# Patient Record
Sex: MALE | Race: WHITE | NOT HISPANIC OR LATINO | Employment: UNEMPLOYED | URBAN - NONMETROPOLITAN AREA
[De-identification: names, ages, dates, MRNs, and addresses within clinical notes are randomized per-mention and may not be internally consistent; named-entity substitution may affect disease eponyms.]

---

## 2017-01-10 ENCOUNTER — APPOINTMENT (OUTPATIENT)
Dept: GENERAL RADIOLOGY | Facility: HOSPITAL | Age: 37
End: 2017-01-10

## 2017-01-10 ENCOUNTER — HOSPITAL ENCOUNTER (EMERGENCY)
Facility: HOSPITAL | Age: 37
Discharge: HOME | End: 2017-01-11
Attending: EMERGENCY MEDICINE | Admitting: EMERGENCY MEDICINE

## 2017-01-10 DIAGNOSIS — R07.89 OTHER CHEST PAIN: Primary | ICD-10-CM

## 2017-01-10 LAB
ALBUMIN SERPL-MCNC: 4.5 G/DL (ref 3.5–5)
ALBUMIN/GLOB SERPL: 1.3 G/DL (ref 1.1–2.5)
ALP SERPL-CCNC: 65 U/L (ref 24–120)
ALT SERPL W P-5'-P-CCNC: 61 U/L (ref 0–54)
AMPHET+METHAMPHET UR QL: NEGATIVE
AMYLASE SERPL-CCNC: 76 U/L (ref 30–110)
ANION GAP SERPL CALCULATED.3IONS-SCNC: 14 MMOL/L (ref 4–13)
APTT PPP: 30.7 SECONDS (ref 24.1–34.8)
AST SERPL-CCNC: 31 U/L (ref 7–45)
BARBITURATES UR QL SCN: NEGATIVE
BASOPHILS # BLD AUTO: 0.03 10*3/MM3 (ref 0–0.2)
BASOPHILS NFR BLD AUTO: 0.4 % (ref 0–2)
BENZODIAZ UR QL SCN: NEGATIVE
BILIRUB SERPL-MCNC: 0.7 MG/DL (ref 0.1–1)
BILIRUB UR QL STRIP: NEGATIVE
BUN BLD-MCNC: 17 MG/DL (ref 5–21)
BUN/CREAT SERPL: 16.3 (ref 7–25)
CALCIUM SPEC-SCNC: 9.7 MG/DL (ref 8.4–10.4)
CANNABINOIDS SERPL QL: NEGATIVE
CHLORIDE SERPL-SCNC: 103 MMOL/L (ref 98–110)
CLARITY UR: CLEAR
CO2 SERPL-SCNC: 28 MMOL/L (ref 24–31)
COCAINE UR QL: NEGATIVE
COLOR UR: YELLOW
CREAT BLD-MCNC: 1.04 MG/DL (ref 0.5–1.4)
D DIMER PPP FEU-MCNC: 0.23 MG/L (FEU) (ref 0–0.5)
DEPRECATED RDW RBC AUTO: 38.5 FL (ref 40–54)
EOSINOPHIL # BLD AUTO: 0.14 10*3/MM3 (ref 0–0.7)
EOSINOPHIL NFR BLD AUTO: 1.9 % (ref 0–4)
ERYTHROCYTE [DISTWIDTH] IN BLOOD BY AUTOMATED COUNT: 12.8 % (ref 12–15)
GFR SERPL CREATININE-BSD FRML MDRD: 82 ML/MIN/1.73
GLOBULIN UR ELPH-MCNC: 3.4 GM/DL
GLUCOSE BLD-MCNC: 91 MG/DL (ref 70–100)
GLUCOSE UR STRIP-MCNC: NEGATIVE MG/DL
HCT VFR BLD AUTO: 42.6 % (ref 40–52)
HGB BLD-MCNC: 14.9 G/DL (ref 14–18)
HGB UR QL STRIP.AUTO: NEGATIVE
IMM GRANULOCYTES # BLD: 0.01 10*3/MM3 (ref 0–0.03)
IMM GRANULOCYTES NFR BLD: 0.1 % (ref 0–5)
INR PPP: 0.94 (ref 0.91–1.09)
KETONES UR QL STRIP: NEGATIVE
LEUKOCYTE ESTERASE UR QL STRIP.AUTO: NEGATIVE
LIPASE SERPL-CCNC: 88 U/L (ref 23–203)
LYMPHOCYTES # BLD AUTO: 2.79 10*3/MM3 (ref 0.72–4.86)
LYMPHOCYTES NFR BLD AUTO: 37.8 % (ref 15–45)
MCH RBC QN AUTO: 29.6 PG (ref 28–32)
MCHC RBC AUTO-ENTMCNC: 35 G/DL (ref 33–36)
MCV RBC AUTO: 84.7 FL (ref 82–95)
METHADONE UR QL SCN: NEGATIVE
MONOCYTES # BLD AUTO: 0.47 10*3/MM3 (ref 0.19–1.3)
MONOCYTES NFR BLD AUTO: 6.4 % (ref 4–12)
NEUTROPHILS # BLD AUTO: 3.95 10*3/MM3 (ref 1.87–8.4)
NEUTROPHILS NFR BLD AUTO: 53.4 % (ref 39–78)
NITRITE UR QL STRIP: NEGATIVE
NT-PROBNP SERPL-MCNC: 24.5 PG/ML (ref 0–450)
OPIATES UR QL: POSITIVE
PCP UR QL SCN: NEGATIVE
PH UR STRIP.AUTO: 5.5 [PH] (ref 5–8)
PLATELET # BLD AUTO: 220 10*3/MM3 (ref 130–400)
PMV BLD AUTO: 12 FL (ref 6–12)
POTASSIUM BLD-SCNC: 3.9 MMOL/L (ref 3.5–5.3)
PROT SERPL-MCNC: 7.9 G/DL (ref 6.3–8.7)
PROT UR QL STRIP: NEGATIVE
PROTHROMBIN TIME: 12.8 SECONDS (ref 11.9–14.6)
RBC # BLD AUTO: 5.03 10*6/MM3 (ref 4.8–5.9)
SODIUM BLD-SCNC: 145 MMOL/L (ref 135–145)
SP GR UR STRIP: 1.01 (ref 1–1.03)
TROPONIN I SERPL-MCNC: 0 NG/ML (ref 0–0.07)
UROBILINOGEN UR QL STRIP: NORMAL
WBC NRBC COR # BLD: 7.39 10*3/MM3 (ref 4.8–10.8)

## 2017-01-10 PROCEDURE — 84484 ASSAY OF TROPONIN QUANT: CPT

## 2017-01-10 PROCEDURE — 80307 DRUG TEST PRSMV CHEM ANLYZR: CPT | Performed by: PHYSICIAN ASSISTANT

## 2017-01-10 PROCEDURE — 93005 ELECTROCARDIOGRAM TRACING: CPT | Performed by: EMERGENCY MEDICINE

## 2017-01-10 PROCEDURE — 96374 THER/PROPH/DIAG INJ IV PUSH: CPT

## 2017-01-10 PROCEDURE — 85025 COMPLETE CBC W/AUTO DIFF WBC: CPT | Performed by: EMERGENCY MEDICINE

## 2017-01-10 PROCEDURE — 80053 COMPREHEN METABOLIC PANEL: CPT | Performed by: EMERGENCY MEDICINE

## 2017-01-10 PROCEDURE — 82150 ASSAY OF AMYLASE: CPT | Performed by: PHYSICIAN ASSISTANT

## 2017-01-10 PROCEDURE — 85379 FIBRIN DEGRADATION QUANT: CPT | Performed by: PHYSICIAN ASSISTANT

## 2017-01-10 PROCEDURE — 71010 HC CHEST PA OR AP: CPT

## 2017-01-10 PROCEDURE — 85730 THROMBOPLASTIN TIME PARTIAL: CPT | Performed by: EMERGENCY MEDICINE

## 2017-01-10 PROCEDURE — 93010 ELECTROCARDIOGRAM REPORT: CPT | Performed by: INTERNAL MEDICINE

## 2017-01-10 PROCEDURE — 99284 EMERGENCY DEPT VISIT MOD MDM: CPT

## 2017-01-10 PROCEDURE — 83880 ASSAY OF NATRIURETIC PEPTIDE: CPT | Performed by: PHYSICIAN ASSISTANT

## 2017-01-10 PROCEDURE — 85610 PROTHROMBIN TIME: CPT | Performed by: EMERGENCY MEDICINE

## 2017-01-10 PROCEDURE — 83690 ASSAY OF LIPASE: CPT | Performed by: PHYSICIAN ASSISTANT

## 2017-01-10 PROCEDURE — 96361 HYDRATE IV INFUSION ADD-ON: CPT

## 2017-01-10 PROCEDURE — 81003 URINALYSIS AUTO W/O SCOPE: CPT | Performed by: EMERGENCY MEDICINE

## 2017-01-10 RX ORDER — MELOXICAM 15 MG/1
15 TABLET ORAL DAILY
COMMUNITY
End: 2018-04-20

## 2017-01-10 RX ORDER — FAMOTIDINE 10 MG/ML
20 INJECTION, SOLUTION INTRAVENOUS ONCE
Status: COMPLETED | OUTPATIENT
Start: 2017-01-10 | End: 2017-01-10

## 2017-01-10 RX ORDER — HYDROCODONE BITARTRATE AND ACETAMINOPHEN 7.5; 325 MG/1; MG/1
1 TABLET ORAL EVERY 6 HOURS PRN
COMMUNITY
End: 2018-04-20

## 2017-01-10 RX ORDER — ASPIRIN 325 MG
325 TABLET ORAL ONCE
Status: DISCONTINUED | OUTPATIENT
Start: 2017-01-10 | End: 2017-01-10

## 2017-01-10 RX ORDER — SODIUM CHLORIDE 9 MG/ML
125 INJECTION, SOLUTION INTRAVENOUS CONTINUOUS
Status: DISCONTINUED | OUTPATIENT
Start: 2017-01-10 | End: 2017-01-11 | Stop reason: HOSPADM

## 2017-01-10 RX ORDER — ASPIRIN 325 MG
162 TABLET ORAL ONCE
Status: DISCONTINUED | OUTPATIENT
Start: 2017-01-10 | End: 2017-01-10

## 2017-01-10 RX ORDER — ONDANSETRON 4 MG/1
4 TABLET, ORALLY DISINTEGRATING ORAL EVERY 8 HOURS PRN
COMMUNITY
End: 2018-04-20

## 2017-01-10 RX ORDER — SODIUM CHLORIDE 0.9 % (FLUSH) 0.9 %
10 SYRINGE (ML) INJECTION AS NEEDED
Status: DISCONTINUED | OUTPATIENT
Start: 2017-01-10 | End: 2017-01-11 | Stop reason: HOSPADM

## 2017-01-10 RX ORDER — ASPIRIN 81 MG/1
162 TABLET, CHEWABLE ORAL DAILY
Status: DISCONTINUED | OUTPATIENT
Start: 2017-01-11 | End: 2017-01-11 | Stop reason: HOSPADM

## 2017-01-10 RX ADMIN — SODIUM CHLORIDE 125 ML/HR: 9 INJECTION, SOLUTION INTRAVENOUS at 22:39

## 2017-01-10 RX ADMIN — SODIUM CHLORIDE, PRESERVATIVE FREE 10 ML: 5 INJECTION INTRAVENOUS at 22:32

## 2017-01-10 RX ADMIN — ASPIRIN 81 MG 162 MG: 81 TABLET ORAL at 22:27

## 2017-01-10 RX ADMIN — SODIUM CHLORIDE, PRESERVATIVE FREE 10 ML: 5 INJECTION INTRAVENOUS at 22:57

## 2017-01-10 RX ADMIN — FAMOTIDINE 20 MG: 10 INJECTION, SOLUTION INTRAVENOUS at 22:52

## 2017-01-11 VITALS
HEIGHT: 71 IN | RESPIRATION RATE: 15 BRPM | TEMPERATURE: 98.2 F | OXYGEN SATURATION: 97 % | BODY MASS INDEX: 30.52 KG/M2 | SYSTOLIC BLOOD PRESSURE: 118 MMHG | DIASTOLIC BLOOD PRESSURE: 80 MMHG | HEART RATE: 61 BPM | WEIGHT: 218 LBS

## 2017-01-11 LAB — TROPONIN I SERPL-MCNC: 0 NG/ML (ref 0–0.07)

## 2017-01-11 PROCEDURE — 96361 HYDRATE IV INFUSION ADD-ON: CPT

## 2017-01-11 PROCEDURE — 84484 ASSAY OF TROPONIN QUANT: CPT

## 2017-01-11 PROCEDURE — 93010 ELECTROCARDIOGRAM REPORT: CPT | Performed by: INTERNAL MEDICINE

## 2017-01-11 NOTE — ED PROVIDER NOTES
Subjective   Patient is a 36 y.o. male presenting with chest pain.   Chest Pain   Associated symptoms: nausea and shortness of breath      Patient is a pleasant 36 year old  gentleman that is present ED with his mother.  He describes experiencing chest discomfort for the past 4 days.  He states it has been sharp on the left side with occasional dull pain.  He does report that it radiates down his left arm and rarely up into his left side of his neck.  He states that the pain lasts for hours at a time.  He notices the pain occurs with rest and exertion.  He denies exertion making it worse.  He states that deep inspiration makes his pain worse.  He denies any cough.  He denies any fever.  He denies any injections.  He has been experiencing nausea, dizziness, shortness of breath, and diaphoresis as well.  At its worst, the patient's pain is at a moderate level.  Presently, it is mild.  His mother gave him aspirin 81 mg ×2 prior to arrival.    The patient denies ever completing a stress test, stress echo, or cardiac catheterization.  He denies a personal history of hyperlipidemia, hypercholesterolemia, tobacco abuse, or diabetes.  He denies family cardiac history.  He does have a history of cervical disc disease with a known herniation.  He receives pain medications for this but denies any injection,.  He reports the pain presents differently for his neck pain.  He also has a known lower lumbar herniated disc.  He denies any recent procedures or travels.  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  HE denies any leg pain or cramping.      Review of Systems   Constitutional: Negative.    HENT: Negative.    Respiratory: Positive for shortness of breath.    Cardiovascular: Positive for chest pain.   Gastrointestinal: Positive for nausea.   Genitourinary: Negative.    All other systems reviewed and are negative.      Past Medical History   Diagnosis Date   • Cervical herniated disc    •  "Lumbar herniated disc        No Known Allergies    Past Surgical History   Procedure Laterality Date   • Femur surgery     • Ankle surgery     • Mandible fracture surgery         History reviewed. No pertinent family history.    Social History     Social History   • Marital status:      Spouse name: N/A   • Number of children: N/A   • Years of education: N/A     Social History Main Topics   • Smoking status: Never Smoker   • Smokeless tobacco: Current User   • Alcohol use No   • Drug use: No   • Sexual activity: Not Asked     Other Topics Concern   • None     Social History Narrative   • None       Prior to Admission medications    Medication Sig Start Date End Date Taking? Authorizing Provider   HYDROcodone-acetaminophen (NORCO) 7.5-325 MG per tablet Take 1 tablet by mouth Every 6 (Six) Hours As Needed for moderate pain (4-6).   Yes Historical Provider, MD   meloxicam (MOBIC) 15 MG tablet Take 15 mg by mouth Daily.   Yes Historical Provider, MD   ondansetron ODT (ZOFRAN-ODT) 4 MG disintegrating tablet Take 4 mg by mouth Every 8 (Eight) Hours As Needed for nausea or vomiting.   Yes Historical Provider, MD       Medications   famotidine (PEPCID) injection 20 mg (20 mg Intravenous Given 1/10/17 1940)       Visit Vitals   • /80 (BP Location: Left arm, Patient Position: Lying)   • Pulse 61   • Temp 98.2 °F (36.8 °C) (Oral)   • Resp 15   • Ht 71\" (180.3 cm)   • Wt 218 lb (98.9 kg)   • SpO2 97%   • BMI 30.4 kg/m2         Objective   Physical Exam   Constitutional: He is oriented to person, place, and time. He appears well-developed and well-nourished.   HENT:   Head: Normocephalic and atraumatic.   Nose: Nose normal.   Eyes: Conjunctivae and EOM are normal. Pupils are equal, round, and reactive to light.   Neck: Normal range of motion. Neck supple. No tracheal deviation present.   Cardiovascular: Normal rate, regular rhythm, normal heart sounds and intact distal pulses.  Exam reveals no gallop and no " friction rub.    No murmur heard.  Pulmonary/Chest: Effort normal and breath sounds normal. No respiratory distress. He has no wheezes. He has no rales. He exhibits no tenderness.   Abdominal: Soft. Bowel sounds are normal. He exhibits no distension and no mass. There is no tenderness. There is no rebound and no guarding.   Musculoskeletal: Normal range of motion. He exhibits no edema, tenderness or deformity.   Neurological: He is alert and oriented to person, place, and time.   Skin: Skin is warm and dry. No rash noted. No erythema. No pallor.   Psychiatric: He has a normal mood and affect. His behavior is normal. Judgment and thought content normal.   Vitals reviewed.      Procedures         Lab Results (last 24 hours)     Procedure Component Value Units Date/Time    Urinalysis With / Culture If Indicated [[id number]]  (Normal) Collected:  01/10/17 2215    Specimen:  Urine from Urine, Clean Catch Updated:  01/10/17 2256     Color, UA Yellow      Appearance, UA Clear      pH, UA 5.5      Specific Gravity, UA 1.010      Glucose, UA Negative      Ketones, UA Negative      Bilirubin, UA Negative      Blood, UA Negative      Protein, UA Negative      Leuk Esterase, UA Negative      Nitrite, UA Negative      Urobilinogen, UA 0.2 E.U./dL     Narrative:       Urine microscopic not indicated.    Urine Drug Screen [[id number]]  (Abnormal) Collected:  01/10/17 2215    Specimen:  Urine from Urine, Clean Catch Updated:  01/10/17 2314     Amphetamine Screen, Urine Negative      Barbiturates Screen, Urine Negative      Benzodiazepine Screen, Urine Negative      Cocaine Screen, Urine Negative      Methadone Screen, Urine Negative      Opiate Screen Positive (A)      Phencyclidine (PCP), Urine Negative      THC, Screen, Urine Negative     Narrative:       Negative Thresholds For Drugs Screened in Urine:    Amphetamines          500 ng/ml  Barbiturates          200 ng/ml  Benzodiazepines       200 ng/ml  Cocaine               150  ng/ml  Methadone             150 ng/ml  Opiates               300 ng/ml  Phencyclidine         25 ng/ml  THC                      50 ng/ml    The normal value for all drugs tested is negative. This report includes final unconfirmed screening results.  A positive result by this assay can be, at your request, sent to the Reference Lab for confirmation by gas chromatography. Unconfirmed results must not be used for non-medical purposes, such as employment or legal testing. Clinical consideration should be applied to any drug of abuse test result, particularly when unconfirmed results are used.    CBC & Differential [[id number]] Collected:  01/10/17 2230    Specimen:  Blood Updated:  01/10/17 2255    Narrative:       The following orders were created for panel order CBC & Differential.  Procedure                               Abnormality         Status                     ---------                               -----------         ------                     CBC Auto Differential[[id number]]         Abnormal            Final result                 Please view results for these tests on the individual orders.    aPTT [[id number]]  (Normal) Collected:  01/10/17 2230    Specimen:  Blood from Hand, Left Updated:  01/10/17 2306     PTT 30.7 seconds     Protime-INR [[id number]]  (Normal) Collected:  01/10/17 2230    Specimen:  Blood from Hand, Left Updated:  01/10/17 2306     Protime 12.8 Seconds      INR 0.94     Comprehensive Metabolic Panel [[id number]]  (Abnormal) Collected:  01/10/17 2230    Specimen:  Blood from Hand, Left Updated:  01/10/17 2306     Glucose 91 mg/dL      BUN 17 mg/dL      Creatinine 1.04 mg/dL      Sodium 145 mmol/L      Potassium 3.9 mmol/L      Chloride 103 mmol/L      CO2 28.0 mmol/L      Calcium 9.7 mg/dL      Total Protein 7.9 g/dL      Albumin 4.50 g/dL      ALT (SGPT) 61 (H) U/L      AST (SGOT) 31 U/L      Alkaline Phosphatase 65 U/L      Total Bilirubin 0.7 mg/dL      eGFR Non African Amer 82 mL/min/1.73       Globulin 3.4 gm/dL      A/G Ratio 1.3 g/dL      BUN/Creatinine Ratio 16.3      Anion Gap 14.0 (H) mmol/L     CBC Auto Differential [[id number]]  (Abnormal) Collected:  01/10/17 2230    Specimen:  Blood from Hand, Left Updated:  01/10/17 2255     WBC 7.39 10*3/mm3      RBC 5.03 10*6/mm3      Hemoglobin 14.9 g/dL      Hematocrit 42.6 %      MCV 84.7 fL      MCH 29.6 pg      MCHC 35.0 g/dL      RDW 12.8 %      RDW-SD 38.5 (L) fl      MPV 12.0 fL      Platelets 220 10*3/mm3      Neutrophil % 53.4 %      Lymphocyte % 37.8 %      Monocyte % 6.4 %      Eosinophil % 1.9 %      Basophil % 0.4 %      Immature Grans % 0.1 %      Neutrophils, Absolute 3.95 10*3/mm3      Lymphocytes, Absolute 2.79 10*3/mm3      Monocytes, Absolute 0.47 10*3/mm3      Eosinophils, Absolute 0.14 10*3/mm3      Basophils, Absolute 0.03 10*3/mm3      Immature Grans, Absolute 0.01 10*3/mm3     Amylase [[id number]]  (Normal) Collected:  01/10/17 2230    Specimen:  Blood from Hand, Left Updated:  01/10/17 2306     Amylase 76 U/L     Lipase [[id number]]  (Normal) Collected:  01/10/17 2230    Specimen:  Blood from Hand, Left Updated:  01/10/17 2306     Lipase 88 U/L     D-dimer, Quantitative [[id number]]  (Normal) Collected:  01/10/17 2230    Specimen:  Blood from Hand, Left Updated:  01/10/17 2306     D-Dimer, Quantitative 0.23 mg/L (FEU)     Narrative:       Reference Range is 0-0.50 mg/L FEU. However, results <0.50 mg/L FEU tends to rule out DVT or PE. Results >0.50 mg/L FEU are not useful in predicting absence or presence of DVT or PE.    BNP [[id number]]  (Normal) Collected:  01/10/17 2230    Specimen:  Blood from Hand, Left Updated:  01/10/17 2314     proBNP 24.5 pg/mL     POC Troponin, Rapid [[id number]]  (Normal) Collected:  01/10/17 2249    Specimen:  Blood Updated:  01/10/17 2305     Troponin I 0.00 ng/mL       Serial Number: [id number]    : [id number]       POC Troponin, Rapid [[id number]]  (Normal) Collected:  01/11/17 0134    Specimen:  Blood Updated:   01/11/17 0150     Troponin I 0.00 ng/mL       Serial Number: [id number]    : [id number]             Xr Chest 1 View    Result Date: 1/11/2017  Narrative: EXAMINATION: XR CHEST 1 VW- 1/11/2017 7:06 AM CST  HISTORY: Chest pain  COMPARISON: None  FINDINGS: Heart and mediastinal contours are normal. The lungs are clear without focal consolidation or effusion. No pneumothorax. Normal pulmonary vasculature. Prior granulomatous exposure.      Impression: No acute findings. This report was finalized on 01/11/2017 09:37 by Dr. Rob Gomez MD.      ED Course  ED Course   Value Comment By Time   Albumin: 4.50 (Reviewed) Val Cespedes DO 01/11 0127     HEART Score  History: Moderately suspicious (+1)  ECG: Normal (+0)  Age: Less than 45 (+0)  Risk Factors: No risk factors known (+0)  Troponin: Normal limit or lower (+0)  Total: 1       I reviewed this case with Dr. Cespedes who will be assuming the patient's care.     Working diagnosis is:  MDM    Final diagnoses:   Other chest pain          Maggyu-LOUIS Barron  01/11/17 1250

## 2017-01-13 ENCOUNTER — HOSPITAL ENCOUNTER (OUTPATIENT)
Dept: CARDIOLOGY | Facility: HOSPITAL | Age: 37
Discharge: HOME | End: 2017-01-13
Attending: EMERGENCY MEDICINE | Admitting: EMERGENCY MEDICINE

## 2017-01-13 VITALS
SYSTOLIC BLOOD PRESSURE: 127 MMHG | HEART RATE: 66 BPM | DIASTOLIC BLOOD PRESSURE: 76 MMHG | BODY MASS INDEX: 30.52 KG/M2 | WEIGHT: 218 LBS | HEIGHT: 71 IN

## 2017-01-13 DIAGNOSIS — R07.89 OTHER CHEST PAIN: ICD-10-CM

## 2017-01-13 LAB
BH CV STRESS BP STAGE 1: NORMAL
BH CV STRESS BP STAGE 2: NORMAL
BH CV STRESS BP STAGE 3: NORMAL
BH CV STRESS BP STAGE 4: NORMAL
BH CV STRESS DURATION MIN STAGE 1: 3
BH CV STRESS DURATION MIN STAGE 2: 3
BH CV STRESS DURATION MIN STAGE 3: 3
BH CV STRESS DURATION MIN STAGE 4: 1
BH CV STRESS DURATION SEC STAGE 1: 0
BH CV STRESS DURATION SEC STAGE 2: 0
BH CV STRESS DURATION SEC STAGE 3: 0
BH CV STRESS DURATION SEC STAGE 4: 47
BH CV STRESS GRADE STAGE 1: 10
BH CV STRESS GRADE STAGE 2: 12
BH CV STRESS GRADE STAGE 3: 14
BH CV STRESS GRADE STAGE 4: 16
BH CV STRESS HR STAGE 1: 90
BH CV STRESS HR STAGE 2: 115
BH CV STRESS HR STAGE 3: 150
BH CV STRESS HR STAGE 4: 180
BH CV STRESS METS STAGE 1: 5
BH CV STRESS METS STAGE 2: 7.5
BH CV STRESS METS STAGE 3: 10
BH CV STRESS METS STAGE 4: 13.5
BH CV STRESS PROTOCOL 1: NORMAL
BH CV STRESS RECOVERY BP: NORMAL MMHG
BH CV STRESS RECOVERY HR: 99 BPM
BH CV STRESS SPEED STAGE 1: 1.7
BH CV STRESS SPEED STAGE 2: 2.5
BH CV STRESS SPEED STAGE 3: 3.4
BH CV STRESS SPEED STAGE 4: 4.2
BH CV STRESS STAGE 1: 1
BH CV STRESS STAGE 2: 2
BH CV STRESS STAGE 3: 3
BH CV STRESS STAGE 4: 4
MAXIMAL PREDICTED HEART RATE: 186 BPM
PERCENT MAX PREDICTED HR: 96.77 %
STRESS BASELINE BP: NORMAL MMHG
STRESS BASELINE HR: 66 BPM
STRESS PERCENT HR: 114 %
STRESS POST ESTIMATED WORKLOAD: 13.5 METS
STRESS POST EXERCISE DUR MIN: 10 MIN
STRESS POST EXERCISE DUR SEC: 47 SEC
STRESS POST PEAK BP: NORMAL MMHG
STRESS POST PEAK HR: 180 BPM
STRESS TARGET HR: 158 BPM

## 2017-01-13 PROCEDURE — 93017 CV STRESS TEST TRACING ONLY: CPT

## 2017-01-13 PROCEDURE — 25010000002 PERFLUTREN (DEFINITY) 8.476 MG IN SODIUM CHLORIDE 10 ML INJECTION: Performed by: INTERNAL MEDICINE

## 2017-01-13 PROCEDURE — 93351 STRESS TTE COMPLETE: CPT

## 2017-01-13 RX ADMIN — SODIUM CHLORIDE 5 ML: 9 INJECTION INTRAMUSCULAR; INTRAVENOUS; SUBCUTANEOUS at 14:45

## 2017-01-13 RX ADMIN — SODIUM CHLORIDE 5 ML: 9 INJECTION INTRAMUSCULAR; INTRAVENOUS; SUBCUTANEOUS at 13:59

## 2017-07-10 ENCOUNTER — APPOINTMENT (OUTPATIENT)
Dept: CT IMAGING | Facility: HOSPITAL | Age: 37
End: 2017-07-10

## 2017-07-10 ENCOUNTER — HOSPITAL ENCOUNTER (EMERGENCY)
Facility: HOSPITAL | Age: 37
Discharge: HOME | End: 2017-07-11
Admitting: EMERGENCY MEDICINE

## 2017-07-10 DIAGNOSIS — G43.809 HEADACHE, VARIANT MIGRAINE: ICD-10-CM

## 2017-07-10 DIAGNOSIS — H53.8 BLURRY VISION, BILATERAL: Primary | ICD-10-CM

## 2017-07-10 PROCEDURE — 99284 EMERGENCY DEPT VISIT MOD MDM: CPT

## 2017-07-10 PROCEDURE — 70450 CT HEAD/BRAIN W/O DYE: CPT

## 2017-07-10 RX ORDER — TOPIRAMATE 50 MG/1
50 TABLET, FILM COATED ORAL 2 TIMES DAILY
COMMUNITY
End: 2018-04-20

## 2017-07-11 ENCOUNTER — HOSPITAL ENCOUNTER (EMERGENCY)
Facility: HOSPITAL | Age: 37
Discharge: HOME | End: 2017-07-11
Attending: EMERGENCY MEDICINE | Admitting: EMERGENCY MEDICINE

## 2017-07-11 ENCOUNTER — APPOINTMENT (OUTPATIENT)
Dept: MRI IMAGING | Facility: HOSPITAL | Age: 37
End: 2017-07-11

## 2017-07-11 ENCOUNTER — TELEPHONE (OUTPATIENT)
Dept: NEUROLOGY | Facility: CLINIC | Age: 37
End: 2017-07-11

## 2017-07-11 VITALS
HEART RATE: 54 BPM | SYSTOLIC BLOOD PRESSURE: 133 MMHG | DIASTOLIC BLOOD PRESSURE: 80 MMHG | OXYGEN SATURATION: 98 % | RESPIRATION RATE: 16 BRPM | HEIGHT: 71 IN | TEMPERATURE: 98.1 F | WEIGHT: 215 LBS | BODY MASS INDEX: 30.1 KG/M2

## 2017-07-11 VITALS
DIASTOLIC BLOOD PRESSURE: 79 MMHG | OXYGEN SATURATION: 99 % | HEIGHT: 71 IN | RESPIRATION RATE: 18 BRPM | WEIGHT: 214.95 LBS | HEART RATE: 65 BPM | SYSTOLIC BLOOD PRESSURE: 119 MMHG | BODY MASS INDEX: 30.09 KG/M2 | TEMPERATURE: 98.5 F

## 2017-07-11 DIAGNOSIS — H53.8 BLURRED VISION: Primary | ICD-10-CM

## 2017-07-11 PROCEDURE — 0 GADOBENATE DIMEGLUMINE 529 MG/ML SOLUTION: Performed by: PSYCHIATRY & NEUROLOGY

## 2017-07-11 PROCEDURE — 96374 THER/PROPH/DIAG INJ IV PUSH: CPT

## 2017-07-11 PROCEDURE — 70553 MRI BRAIN STEM W/O & W/DYE: CPT

## 2017-07-11 PROCEDURE — A9577 INJ MULTIHANCE: HCPCS | Performed by: PSYCHIATRY & NEUROLOGY

## 2017-07-11 PROCEDURE — 99283 EMERGENCY DEPT VISIT LOW MDM: CPT

## 2017-07-11 PROCEDURE — 25010000002 PROMETHAZINE PER 50 MG: Performed by: PSYCHIATRY & NEUROLOGY

## 2017-07-11 PROCEDURE — 99283 EMERGENCY DEPT VISIT LOW MDM: CPT | Performed by: PSYCHIATRY & NEUROLOGY

## 2017-07-11 PROCEDURE — 96375 TX/PRO/DX INJ NEW DRUG ADDON: CPT

## 2017-07-11 PROCEDURE — 25010000002 DEXAMETHASONE PER 1 MG: Performed by: PSYCHIATRY & NEUROLOGY

## 2017-07-11 RX ORDER — PROMETHAZINE HYDROCHLORIDE 25 MG/ML
25 INJECTION, SOLUTION INTRAMUSCULAR; INTRAVENOUS ONCE
Status: COMPLETED | OUTPATIENT
Start: 2017-07-11 | End: 2017-07-11

## 2017-07-11 RX ORDER — DIPHENHYDRAMINE HYDROCHLORIDE 50 MG/ML
25 INJECTION INTRAMUSCULAR; INTRAVENOUS EVERY 6 HOURS PRN
Status: DISCONTINUED | OUTPATIENT
Start: 2017-07-11 | End: 2017-07-11 | Stop reason: HOSPADM

## 2017-07-11 RX ORDER — DEXAMETHASONE SODIUM PHOSPHATE 10 MG/ML
10 INJECTION INTRAMUSCULAR; INTRAVENOUS ONCE
Status: COMPLETED | OUTPATIENT
Start: 2017-07-11 | End: 2017-07-11

## 2017-07-11 RX ADMIN — PROMETHAZINE HYDROCHLORIDE 25 MG: 25 INJECTION INTRAMUSCULAR; INTRAVENOUS at 14:37

## 2017-07-11 RX ADMIN — GADOBENATE DIMEGLUMINE 10 ML: 529 INJECTION, SOLUTION INTRAVENOUS at 15:05

## 2017-07-11 RX ADMIN — DEXAMETHASONE SODIUM PHOSPHATE 10 MG: 10 INJECTION, SOLUTION INTRAMUSCULAR; INTRAVENOUS at 13:57

## 2017-07-11 NOTE — DISCHARGE INSTRUCTIONS
No driving or working until blurry vision is completely resolved.  Call Dr. St'ss office tomorrow without improvement

## 2017-07-11 NOTE — TELEPHONE ENCOUNTER
"Mom called to request Luis be seen in the office today. He was treated and released from the ER last night. He had a migraine with vision changes. Today he still complains of not being able to see in front of him. Everything looks \"distorted\". Since I was not able to offer an appointment today she did tell me she is bringing him back to the ER to be re evaluated. I did let Dr St know in case he is contacted by the ER.   "

## 2017-07-11 NOTE — CONSULTS
Neurology Consult Note    Referring Provider: Dr. Ronny Fajardo  Reason for Consultation: vision changes      History of present illness:      37-year-old  male with a history of cervical disc disease followed by the orthopedic [facility] here locally.  He also follows with Dr. Frankel locally for pain management.  He presented overnight our emergency Department with decreased visual acuity at distance bilaterally.  At the time he complained of a headache but speaking with him today he denies a headache overnight.  He underwent routine imaging including a CT of the head which was unremarkable.  He was discharged home.  He has re-presented this morning complaining of decreased visual acuity at distance.  He denies a headache although has a headache 5 days a week many weeks although can go a week without it.  He describes it as holocephalic in nature with light sensitivity.  He denies nausea and vomiting.  He does access Norco and Nicole multiple times per day on a daily basis.  He denies fortification spectra.  He does suggest that when he was looking straight life last night they seemed bigger than normal.  In addition to that when he looked at his windshield and his car it appeared to be covered with ice.  He denies unilateral vision loss.  He denies darkening of his vision.  He denies speech changes.  He denies unilateral weakness or numbness.      Past Medical History:   Diagnosis Date   • Cervical herniated disc    • Lumbar herniated disc        No Known Allergies  No current facility-administered medications on file prior to encounter.      Current Outpatient Prescriptions on File Prior to Encounter   Medication Sig   • HYDROcodone-acetaminophen (NORCO) 7.5-325 MG per tablet Take 1 tablet by mouth Every 6 (Six) Hours As Needed for moderate pain (4-6).   • meloxicam (MOBIC) 15 MG tablet Take 15 mg by mouth Daily.   • Multiple Vitamin (ONE-A-DAY MENS PO) Take  by mouth. 2 gummies a day   • ondansetron ODT  (ZOFRAN-ODT) 4 MG disintegrating tablet Take 4 mg by mouth Every 8 (Eight) Hours As Needed for nausea or vomiting.   • topiramate (TOPAMAX) 50 MG tablet Take 50 mg by mouth 2 (Two) Times a Day.       Social History     Social History   • Marital status:      Spouse name: N/A   • Number of children: N/A   • Years of education: N/A     Occupational History   • Not on file.     Social History Main Topics   • Smoking status: Never Smoker   • Smokeless tobacco: Current User   • Alcohol use No   • Drug use: No   • Sexual activity: Not on file     Other Topics Concern   • Not on file     Social History Narrative     Family History   Problem Relation Age of Onset   • Heart disease Paternal Grandmother        Review of Systems  A 14 point review of systems was reviewed and was negative except for Vision changes    Vital Signs   Temp:  [98.1 °F (36.7 °C)-98.7 °F (37.1 °C)] 98.7 °F (37.1 °C)  Heart Rate:  [54-71] 71  Resp:  [16] 16  BP: (121-133)/(80-86) 121/84    General Exam:  Head:  Normal cephalic, atraumatic  HEENT:  Neck supple  Fundoscopic Exam:  No signs of disc edema  CVS:  Regular rate and rhythm.  No murmurs  Carotid Examination:  No bruits  Lungs:  Clear to auscultation  Abdomen:  Non-tender, Non-distended  Extremities:  No signs of peripheral edema  Skin:  No rashes    Neurologic Exam:    Mental Status:    -Awake, Alert, Oriented X 3  -No word finding difficulties  -No aphasia  -No dysarthria  -Follows simple and complex commands    CN II:  Visual fields full.  Pupils equally reactive to light  CN III, IV, VI:  Extraocular Muscles full with no signs of nystagmus  CN V:  Facial sensory is symmetric with no asymetries.  CN VII:  Facial motor symmetric  CN VIII:  Gross hearing intact bilaterally  CN IX:  Palate elevates symmetrically  CN X:  Palate elevates symmetrically  CN XI:  Shoulder shrug symmetric  CN XII:  Tongue is midline on protrusion    Motor: (strength out of 5:  1= minimal movement, 2 = movement  in plane of gravity, 3 = movement against gravity, 4 = movement against some resistance, 5 = full strength)    -Right Upper Ext: Proximal: 5 Distal: 5  -Left Upper Ext: Proximal: 5 Distal: 5    -Right Lower Ext: Proximal: 5 Distal: 5  -Left Lower Ext: Proximal: 5 Distal: 5    DTR:  -Right   Bicep: 2+ Tricep: 2+ Brachoradialis: 2+   Patella: 2+ Ankle: 2+ Neg Babinski  -Left   Bicep: 2+ Tricep: 2+ Brachoradialis: 2+   Patella: 2+ Ankle: 2+ Neg Babinski    Sensory:  -Intact to light touch, pinprick, temperature, pain, and proprioception    Coordination:  -Finger to nose intact  -Heel to shin intact  -No ataxia    Gait  -No signs of ataxia  -ambulates unassisted      Results Review:  Lab Results (last 24 hours)     ** No results found for the last 24 hours. **          .  Imaging Results (last 24 hours)     ** No results found for the last 24 hours. **          CT of head without contrast shows no acute findings.    Impression    1.  Migraine with visual aura    Plan    --We will try migraine cocktail of Decadron 2 mg, Phenergan 25 mg, Benadryl 25 mg  --MR brain with and without contrast to rule out CNS structural lesions as a cause  --From a neurology standpoint cleared to be discharged home which MRI is read and normal.  If the problem persists then follow-up with ophthalmology is recommended.    I discussed the patients findings and my recommendations with patient, family and consulting provider    Pedrito St MD  07/11/17  1:29 PM

## 2017-07-11 NOTE — ED PROVIDER NOTES
Subjective   HPI Comments: Patient states that he has a history of migraines.  He states that he woke up this mornignw ith a severe headache, generalized.  He states that it was worse that his normal migraines.  He took a Topomax that he had at home and after a while was able to go back to sleep.  He states that he woke up later at 9 am and his headache was resolved.  However, he noticed that his vision was blurry in both eyes.  He states that it has been blurry all day long.  He has continued to be without headache since taking Topomax.    Patient is a 37 y.o. male presenting with eye problem.   History provided by:  Patient   used: No    Eye Problem   Location:  Both eyes  Quality: blurry vision.  Severity:  Severe  Onset quality:  Sudden  Duration:  15 hours  Timing:  Constant  Progression:  Unchanged  Chronicity:  New  Context: not burn, not chemical exposure, not contact lens problem, not direct trauma, not using machinery, not scratch, not smoke exposure and not UV exposure    Relieved by:  Nothing  Worsened by:  Nothing  Ineffective treatments:  None tried  Associated symptoms: blurred vision    Associated symptoms: no crusting, no discharge, no double vision, no facial rash, no inflammation, no itching, no nausea, no numbness, no photophobia, no redness, no scotomas, no swelling, no tearing, no tingling, no vomiting and no weakness        Review of Systems   Constitutional: Negative.    HENT:        Blurry vision   Eyes: Positive for blurred vision. Negative for double vision, photophobia, discharge, redness and itching.   Respiratory: Negative.    Cardiovascular: Negative.    Gastrointestinal: Negative.  Negative for nausea and vomiting.   Endocrine: Negative.    Genitourinary: Negative.    Musculoskeletal: Negative.    Skin: Negative.    Allergic/Immunologic: Negative.    Neurological: Negative for tingling, weakness and numbness.   Psychiatric/Behavioral: Negative.        Past Medical  History:   Diagnosis Date   • Cervical herniated disc    • Lumbar herniated disc        No Known Allergies    Past Surgical History:   Procedure Laterality Date   • ANKLE SURGERY     • FEMUR SURGERY     • MANDIBLE FRACTURE SURGERY         Family History   Problem Relation Age of Onset   • Heart disease Paternal Grandmother        Social History     Social History   • Marital status:      Spouse name: N/A   • Number of children: N/A   • Years of education: N/A     Social History Main Topics   • Smoking status: Never Smoker   • Smokeless tobacco: Current User   • Alcohol use No   • Drug use: No   • Sexual activity: Not Asked     Other Topics Concern   • None     Social History Narrative           Objective   Physical Exam   Constitutional: He appears well-developed and well-nourished. No distress.   HENT:   Head: Normocephalic and atraumatic.   Eyes: Conjunctivae, EOM and lids are normal. Pupils are equal, round, and reactive to light. Right eye exhibits no discharge, no exudate and no hordeolum. No foreign body present in the right eye. Left eye exhibits no discharge, no exudate and no hordeolum. No foreign body present in the left eye.   Visual acuity 20/160 bilaterally   Skin: He is not diaphoretic.   Nursing note and vitals reviewed.      Procedures         ED Course  ED Course   Comment By Time   Discussed case with Dr. St.  Discussed headache that resolved, continued blurry vision with VC of 20/160 bilaterally and negative CT head.  Dr. St felt that patient could be discharged home.  He states that patient can call his office without improvement for follow-up.  States that patient cannot drive until vision has resolved. Onofre Garner PA-C 07/11 0027                  [facility]    Final diagnoses:   Blurry vision, bilateral   Headache, variant migraine            Onofre Garner PA-C  07/11/17 0029

## 2017-07-12 NOTE — ED PROVIDER NOTES
Subjective   HPI Comments: Patient had migraine headache 2 days ago and took a topamax for it.  Went to bed and when woke up headache was gone but vision blurred at distance though can see close.  Seen in ED yesterday and came back to see Dr. St today in ED.    Patient is a 37 y.o. male presenting with eye problem.   History provided by:  Patient   used: No    Eye Problem   Location:  Both eyes  Quality: blurred vision at distance.  Severity:  Moderate  Onset quality:  Gradual  Duration:  1 day  Timing:  Constant  Progression:  Unchanged  Chronicity:  New  Context: not burn, not chemical exposure, not direct trauma, not foreign body, not using machinery, not scratch, not smoke exposure and not UV exposure    Relieved by:  Nothing  Worsened by:  Nothing  Ineffective treatments:  None tried  Associated symptoms: blurred vision and headaches    Associated symptoms: no crusting, no decreased vision, no discharge, no double vision, no facial rash, no inflammation, no itching, no nausea, no numbness, no photophobia, no redness, no scotomas, no swelling, no tearing, no tingling, no vomiting and no weakness    Risk factors: no conjunctival hemorrhage, no exposure to pinkeye, no previous injury to eye, no recent herpes zoster and no recent URI        Review of Systems   Constitutional: Negative.    Eyes: Positive for blurred vision and visual disturbance. Negative for double vision, photophobia, discharge, redness and itching.   Respiratory: Negative.    Cardiovascular: Negative.    Gastrointestinal: Negative.  Negative for nausea and vomiting.   Genitourinary: Negative.    Musculoskeletal: Negative.    Neurological: Positive for headaches. Negative for tingling, weakness and numbness.   Hematological: Negative.    Psychiatric/Behavioral: Negative.    All other systems reviewed and are negative.      Past Medical History:   Diagnosis Date   • Cervical herniated disc    • Lumbar herniated disc         No Known Allergies    Past Surgical History:   Procedure Laterality Date   • ANKLE SURGERY     • FEMUR SURGERY     • MANDIBLE FRACTURE SURGERY         Family History   Problem Relation Age of Onset   • Heart disease Paternal Grandmother        Social History     Social History   • Marital status:      Spouse name: N/A   • Number of children: N/A   • Years of education: N/A     Social History Main Topics   • Smoking status: Never Smoker   • Smokeless tobacco: Current User   • Alcohol use No   • Drug use: No   • Sexual activity: Not Asked     Other Topics Concern   • None     Social History Narrative           Objective   Physical Exam   Constitutional: He is oriented to person, place, and time. He appears well-developed.   Eyes: EOM are normal. Pupils are equal, round, and reactive to light.   Neurological: He is alert and oriented to person, place, and time. He displays normal reflexes. No cranial nerve deficit. He exhibits normal muscle tone. Coordination normal.   Psychiatric: He has a normal mood and affect. His behavior is normal.   Nursing note and vitals reviewed.      Procedures         ED Course  ED Course   Comment By Time   Dr. St had examined and treated patient and asked that I check on results of patient MRI.  They were normal and I told patient this and to follow up with opthamology if symptoms continue based on Dr. St's advice. Ga Phillips Jr., MD 07/12 6715                  [facility]  Number of Diagnoses or Management Options  Blurred vision: new and requires workup     Amount and/or Complexity of Data Reviewed  Tests in the radiology section of CPT®: ordered and reviewed    Risk of Complications, Morbidity, and/or Mortality  Presenting problems: low  Diagnostic procedures: low  Management options: low    Patient Progress  Patient progress: stable      Final diagnoses:   Blurred vision            Ga Phillips Jr., MD  07/12/17 3567

## 2018-01-30 ENCOUNTER — APPOINTMENT (OUTPATIENT)
Dept: LAB | Facility: HOSPITAL | Age: 38
End: 2018-01-30

## 2018-01-30 PROCEDURE — 80307 DRUG TEST PRSMV CHEM ANLYZR: CPT

## 2018-02-19 ENCOUNTER — HOSPITAL ENCOUNTER (EMERGENCY)
Facility: HOSPITAL | Age: 38
Discharge: HOME | End: 2018-02-19
Attending: EMERGENCY MEDICINE | Admitting: EMERGENCY MEDICINE

## 2018-02-19 VITALS
TEMPERATURE: 98 F | SYSTOLIC BLOOD PRESSURE: 123 MMHG | HEART RATE: 60 BPM | DIASTOLIC BLOOD PRESSURE: 96 MMHG | OXYGEN SATURATION: 96 % | BODY MASS INDEX: 27.72 KG/M2 | HEIGHT: 71 IN | RESPIRATION RATE: 16 BRPM | WEIGHT: 198 LBS

## 2018-02-19 DIAGNOSIS — R07.9 CHEST PAIN, UNSPECIFIED TYPE: Primary | ICD-10-CM

## 2018-02-19 LAB
ALBUMIN SERPL-MCNC: 4.1 G/DL (ref 3.5–5)
ALBUMIN/GLOB SERPL: 1.5 G/DL (ref 1.1–2.5)
ALP SERPL-CCNC: 59 U/L (ref 24–120)
ALT SERPL W P-5'-P-CCNC: 31 U/L (ref 0–54)
ANION GAP SERPL CALCULATED.3IONS-SCNC: 14 MMOL/L (ref 4–13)
AST SERPL-CCNC: 22 U/L (ref 7–45)
BASOPHILS # BLD AUTO: 0.05 10*3/MM3 (ref 0–0.2)
BASOPHILS NFR BLD AUTO: 0.7 % (ref 0–2)
BILIRUB SERPL-MCNC: 0.4 MG/DL (ref 0.1–1)
BUN BLD-MCNC: 20 MG/DL (ref 5–21)
BUN/CREAT SERPL: 17.4 (ref 7–25)
CALCIUM SPEC-SCNC: 9.7 MG/DL (ref 8.4–10.4)
CHLORIDE SERPL-SCNC: 103 MMOL/L (ref 98–110)
CO2 SERPL-SCNC: 29 MMOL/L (ref 24–31)
CREAT BLD-MCNC: 1.15 MG/DL (ref 0.5–1.4)
DEPRECATED RDW RBC AUTO: 39.7 FL (ref 40–54)
EOSINOPHIL # BLD AUTO: 0.14 10*3/MM3 (ref 0–0.7)
EOSINOPHIL NFR BLD AUTO: 1.9 % (ref 0–4)
ERYTHROCYTE [DISTWIDTH] IN BLOOD BY AUTOMATED COUNT: 12.8 % (ref 12–15)
GFR SERPL CREATININE-BSD FRML MDRD: 72 ML/MIN/1.73
GLOBULIN UR ELPH-MCNC: 2.7 GM/DL
GLUCOSE BLD-MCNC: 92 MG/DL (ref 70–100)
HCT VFR BLD AUTO: 43.4 % (ref 40–52)
HGB BLD-MCNC: 14.5 G/DL (ref 14–18)
HOLD SPECIMEN: NORMAL
HOLD SPECIMEN: NORMAL
IMM GRANULOCYTES # BLD: 0.02 10*3/MM3 (ref 0–0.03)
IMM GRANULOCYTES NFR BLD: 0.3 % (ref 0–5)
LYMPHOCYTES # BLD AUTO: 3.12 10*3/MM3 (ref 0.72–4.86)
LYMPHOCYTES NFR BLD AUTO: 41.9 % (ref 15–45)
MCH RBC QN AUTO: 28.3 PG (ref 28–32)
MCHC RBC AUTO-ENTMCNC: 33.4 G/DL (ref 33–36)
MCV RBC AUTO: 84.8 FL (ref 82–95)
MONOCYTES # BLD AUTO: 0.52 10*3/MM3 (ref 0.19–1.3)
MONOCYTES NFR BLD AUTO: 7 % (ref 4–12)
NEUTROPHILS # BLD AUTO: 3.59 10*3/MM3 (ref 1.87–8.4)
NEUTROPHILS NFR BLD AUTO: 48.2 % (ref 39–78)
NRBC BLD MANUAL-RTO: 0 /100 WBC (ref 0–0)
PLATELET # BLD AUTO: 214 10*3/MM3 (ref 130–400)
PMV BLD AUTO: 11.4 FL (ref 6–12)
POTASSIUM BLD-SCNC: 4.4 MMOL/L (ref 3.5–5.3)
PROT SERPL-MCNC: 6.8 G/DL (ref 6.3–8.7)
RBC # BLD AUTO: 5.12 10*6/MM3 (ref 4.8–5.9)
SODIUM BLD-SCNC: 146 MMOL/L (ref 135–145)
TROPONIN I SERPL-MCNC: <0.012 NG/ML (ref 0–0.03)
TROPONIN I SERPL-MCNC: <0.012 NG/ML (ref 0–0.03)
WBC NRBC COR # BLD: 7.44 10*3/MM3 (ref 4.8–10.8)
WHOLE BLOOD HOLD SPECIMEN: NORMAL
WHOLE BLOOD HOLD SPECIMEN: NORMAL

## 2018-02-19 PROCEDURE — 80053 COMPREHEN METABOLIC PANEL: CPT | Performed by: EMERGENCY MEDICINE

## 2018-02-19 PROCEDURE — 93005 ELECTROCARDIOGRAM TRACING: CPT | Performed by: EMERGENCY MEDICINE

## 2018-02-19 PROCEDURE — 93010 ELECTROCARDIOGRAM REPORT: CPT | Performed by: INTERNAL MEDICINE

## 2018-02-19 PROCEDURE — 85025 COMPLETE CBC W/AUTO DIFF WBC: CPT | Performed by: EMERGENCY MEDICINE

## 2018-02-19 PROCEDURE — 84484 ASSAY OF TROPONIN QUANT: CPT | Performed by: EMERGENCY MEDICINE

## 2018-02-19 PROCEDURE — 99284 EMERGENCY DEPT VISIT MOD MDM: CPT

## 2018-02-19 RX ORDER — CIPROFLOXACIN 500 MG/1
250 TABLET, FILM COATED ORAL 2 TIMES DAILY
COMMUNITY
End: 2018-04-20

## 2018-02-19 RX ORDER — KETOROLAC TROMETHAMINE 10 MG/1
10 TABLET, FILM COATED ORAL EVERY 6 HOURS PRN
COMMUNITY
End: 2018-04-20

## 2018-02-19 RX ORDER — SODIUM CHLORIDE 0.9 % (FLUSH) 0.9 %
10 SYRINGE (ML) INJECTION AS NEEDED
Status: DISCONTINUED | OUTPATIENT
Start: 2018-02-19 | End: 2018-02-19 | Stop reason: HOSPADM

## 2018-02-19 NOTE — ED PROVIDER NOTES
Subjective   History of Present Illness     38-year-old male complaining about chest pain began approximately 2 hours prior to arrival.  Patient states that he has had intermittent chest discomfort in the past however is never sought medical attention for.  The patient denies nausea, vomiting, shortness of breath.  Patient states the pain is not exertional.  The patient should not is accompanied by his mother who denies any family history of Marfan syndrome or Mj Danlos syndrome.  The mom denies any known history of sudden cardiac death at a young age.  The mom denies any history of blood clots.    The mother also wanted me to be aware that for approximately 2 weeks the patient's been complaining about a right-sided testicular pain.  The patient states that he has gone to the emergency room in the past for this.  He states that he does not believe any imaging was performed but that he was given ciprofloxacin.    Review of Systems   Constitutional: Negative for activity change, appetite change, chills, diaphoresis, fatigue, fever and unexpected weight change.   HENT: Negative for congestion, dental problem and drooling.    Respiratory: Negative for apnea, cough, choking, chest tightness, shortness of breath, wheezing and stridor.    Cardiovascular: Negative for chest pain, palpitations and leg swelling.   Gastrointestinal: Negative for abdominal distention, abdominal pain, anal bleeding, blood in stool, constipation, diarrhea, nausea and rectal pain.   Musculoskeletal: Negative for arthralgias, back pain, gait problem, joint swelling, myalgias, neck pain and neck stiffness.   Neurological: Negative for dizziness, tremors, seizures, syncope, facial asymmetry, speech difficulty, weakness, light-headedness, numbness and headaches.       Past Medical History:   Diagnosis Date   • Cervical herniated disc    • Lumbar herniated disc        No Known Allergies    Past Surgical History:   Procedure Laterality Date   •  ANKLE SURGERY     • FEMUR SURGERY     • MANDIBLE FRACTURE SURGERY         Family History   Problem Relation Age of Onset   • Heart disease Paternal Grandmother        Social History     Social History   • Marital status:      Spouse name: N/A   • Number of children: N/A   • Years of education: N/A     Social History Main Topics   • Smoking status: Never Smoker   • Smokeless tobacco: Current User   • Alcohol use No   • Drug use: No   • Sexual activity: Not Asked     Other Topics Concern   • None     Social History Narrative           Objective   Physical Exam   Constitutional: He appears well-developed.   Eyes: EOM are normal. Pupils are equal, round, and reactive to light.   Cardiovascular: Normal rate and regular rhythm.  Exam reveals no gallop and no friction rub.    No murmur heard.  Pulmonary/Chest: Effort normal. No respiratory distress. He has no wheezes. He has no rales. He exhibits no tenderness.   Abdominal: Soft. There is no tenderness. Hernia confirmed negative in the right inguinal area and confirmed negative in the left inguinal area.   Genitourinary: Testes normal. Cremasteric reflex is present. Right testis shows no mass, no swelling and no tenderness. Right testis is descended. Cremasteric reflex is not absent on the right side. Left testis shows no mass, no swelling and no tenderness. Left testis is descended. Cremasteric reflex is not absent on the left side. No phimosis, paraphimosis, hypospadias, penile erythema or penile tenderness. No discharge found.   Musculoskeletal: Normal range of motion.   Lymphadenopathy: No inguinal adenopathy noted on the right or left side.   Neurological:   CRANIAL NERVES: Pupils equally round and reactive to light, EOMI, no nystagmus; tongue and uvula midline, no facial droop, speech is clear and normal;   MOTOR EXAM: 5/5 upper and lower extremities and is symmetric; no pronator drift  DTR's: +2/4: knees, ankles, elbows and wrists bilaterally;   SENSATION:  normal dermatomes and grossly intact to light touch and pain.   GAIT: no ataxia and is normal  ROMBERG SIGN: absent  CEREBELLAR:  normal finger to nose and heel to shin exam       Procedures         ED Course  ED Course   Value Comment By Time   Sodium: (!) 146 (Reviewed) Can Moreno,  02/19 0353    Sinus bradycardia at a rate of 55, normal axis, normal intervals, no hypertrophy, no acute ST-T changes Can Moreno,  02/19 0514    Repeat EKG shows sinus bradycardia at a rate of 44, normal axis, no hypertrophy no ST-T changes. Can Moreno,  02/19 0514    On discharge, patient is resting comfortably, tolerating PO, is in no distress.  Patient no longer has chest pain, has no dyspnea. Clinical presentation is not suggestive of pulmonary embolism, cardiac disease or aortic dissection.  Pulses are 2+ in all 4 extremities.  Patient is ambulating well in the ER.  Patient was advised to follow up with their physician or clinic in 1-2 days. Or return to the ER if symptoms worsen Can Moreno,  02/19 0547                  MDM    Final diagnoses:   Chest pain, unspecified type            Can Moreno,   02/19/18 0601

## 2018-02-19 NOTE — DISCHARGE INSTRUCTIONS
Nonspecific Chest Pain  Chest pain can be caused by many different conditions. There is always a chance that your pain could be related to something serious, such as a heart attack or a blood clot in your lungs. Chest pain can also be caused by conditions that are not life-threatening. If you have chest pain, it is very important to follow up with your health care provider.  What are the causes?  Causes of this condition include:  · Heartburn.  · Pneumonia or bronchitis.  · Anxiety or stress.  · Inflammation around your heart (pericarditis) or lung (pleuritis or pleurisy).  · A blood clot in your lung.  · A collapsed lung (pneumothorax). This can develop suddenly on its own (spontaneous pneumothorax) or from trauma to the chest.  · Shingles infection (varicella-zoster virus).  · Heart attack.  · Damage to the bones, muscles, and cartilage that make up your chest wall. This can include:  ¨ Bruised bones due to injury.  ¨ Strained muscles or cartilage due to frequent or repeated coughing or overwork.  ¨ Fracture to one or more ribs.  ¨ Sore cartilage due to inflammation (costochondritis).  What increases the risk?  Risk factors for this condition may include:  · Activities that increase your risk for trauma or injury to your chest.  · Respiratory infections or conditions that cause frequent coughing.  · Medical conditions or overeating that can cause heartburn.  · Heart disease or family history of heart disease.  · Conditions or health behaviors that increase your risk of developing a blood clot.  · Having had chicken pox (varicella zoster).  What are the signs or symptoms?  Chest pain can feel like:  · Burning or tingling on the surface of your chest or deep in your chest.  · Crushing, pressure, aching, or squeezing pain.  · Dull or sharp pain that is worse when you move, cough, or take a deep breath.  · Pain that is also felt in your back, neck, shoulder, or arm, or pain that spreads to any of these areas.  Your  chest pain may come and go, or it may stay constant.  How is this diagnosed?  Lab tests or other studies may be needed to find the cause of your pain. Your health care provider may have you take a test called an ECG (electrocardiogram). An ECG records your heartbeat patterns at the time the test is performed. You may also have other tests, such as:  · Transthoracic echocardiogram (TTE). In this test, sound waves are used to create a picture of the heart structures and to look at how blood flows through your heart.  · Transesophageal echocardiogram (SALENA). This is a more advanced imaging test that takes images from inside your body. It allows your health care provider to see your heart in finer detail.  · Cardiac monitoring. This allows your health care provider to monitor your heart rate and rhythm in real time.  · Holter monitor. This is a portable device that records your heartbeat and can help to diagnose abnormal heartbeats. It allows your health care provider to track your heart activity for several days, if needed.  · Stress tests. These can be done through exercise or by taking medicine that makes your heart beat more quickly.  · Blood tests.  · Other imaging tests.  How is this treated?  Treatment depends on what is causing your chest pain. Treatment may include:  · Medicines. These may include:  ¨ Acid blockers for heartburn.  ¨ Anti-inflammatory medicine.  ¨ Pain medicine for inflammatory conditions.  ¨ Antibiotic medicine, if an infection is present.  ¨ Medicines to dissolve blood clots.  ¨ Medicines to treat coronary artery disease (CAD).  · Supportive care for conditions that do not require medicines. This may include:  ¨ Resting.  ¨ Applying heat or cold packs to injured areas.  ¨ Limiting activities until pain decreases.  Follow these instructions at home:  Medicines   · If you were prescribed an antibiotic, take it as told by your health care provider. Do not stop taking the antibiotic even if you  start to feel better.  · Take over-the-counter and prescription medicines only as told by your health care provider.  Lifestyle   · Do not use any products that contain nicotine or tobacco, such as cigarettes and e-cigarettes. If you need help quitting, ask your health care provider.  · Do not drink alcohol.  · Make lifestyle changes as directed by your health care provider. These may include:  ¨ Getting regular exercise. Ask your health care provider to suggest some activities that are safe for you.  ¨ Eating a heart-healthy diet. A registered dietitian can help you to learn healthy eating options.  ¨ Maintaining a healthy weight.  ¨ Managing diabetes, if necessary.  ¨ Reducing stress, such as with yoga or relaxation techniques.  General instructions   · Avoid any activities that bring on chest pain.  · If heartburn is the cause for your chest pain, raise (elevate) the head of your bed about 6 inches (15 cm) by putting blocks under the legs. Sleeping with more pillows does not effectively relieve heartburn because it only changes the position of your head.  · Keep all follow-up visits as told by your health care provider. This is important. This includes any further testing if your chest pain does not go away.  Contact a health care provider if:  · Your chest pain does not go away.  · You have a rash with blisters on your chest.  · You have a fever.  · You have chills.  Get help right away if:  · Your chest pain is worse.  · You have a cough that gets worse, or you cough up blood.  · You have severe pain in your abdomen.  · You have severe weakness.  · You faint.  · You have sudden, unexplained chest discomfort.  · You have sudden, unexplained discomfort in your arms, back, neck, or jaw.  · You have shortness of breath at any time.  · You suddenly start to sweat, or your skin gets clammy.  · You feel nauseous or you vomit.  · You suddenly feel light-headed or dizzy.  · Your heart begins to beat quickly, or it feels  like it is skipping beats.  These symptoms may represent a serious problem that is an emergency. Do not wait to see if the symptoms will go away. Get medical help right away. Call your local emergency services (911 in the [place] Do not drive yourself to the hospital.  This information is not intended to replace advice given to you by your health care provider. Make sure you discuss any questions you have with your health care provider.  Document Released: 09/27/2006 Document Revised: 09/11/2017 Document Reviewed: 09/11/2017  [employer]vier Interactive Patient Education © 2017 [employer]

## 2018-02-20 NOTE — ED NOTES
"ED Call Back Questions    1. How are you doing since leaving the Emergency Department?    Doing good with my chest  2. Do you have any questions about your discharge instructions? No     3. Have you filled your new prescriptions yet? No   a. Do you have any questions about those medications? No     4. Were you able to make a follow-up appointment with the physician? Yes     5. Do you have a primary care physician? Yes   a. If No, would you like for me to set you up with one? N/A  i. If Yes, “I will have our ED  give you a call right back at this number to work with you on the best time for an appointment.”    6. We are always looking to get better at what we do. Do you have any suggestions for what we can do to be even better? N/A  a. If Yes, \"Thank you for sharing your concerns. I apologize. I will follow up with our manager and patient . Would you like someone to call you back?\" No     7. Is there anything else I can do for you? No   Visit was good     [place]  02/20/18 1533    "

## 2018-04-20 ENCOUNTER — APPOINTMENT (OUTPATIENT)
Dept: GENERAL RADIOLOGY | Facility: HOSPITAL | Age: 38
End: 2018-04-20

## 2018-04-20 ENCOUNTER — HOSPITAL ENCOUNTER (EMERGENCY)
Facility: HOSPITAL | Age: 38
Discharge: HOME | End: 2018-04-21
Admitting: FAMILY MEDICINE

## 2018-04-20 DIAGNOSIS — R11.2 NON-INTRACTABLE VOMITING WITH NAUSEA, UNSPECIFIED VOMITING TYPE: Primary | ICD-10-CM

## 2018-04-20 LAB
ALBUMIN SERPL-MCNC: 4.5 G/DL (ref 3.5–5)
ALBUMIN/GLOB SERPL: 1.5 G/DL (ref 1.1–2.5)
ALP SERPL-CCNC: 71 U/L (ref 24–120)
ALT SERPL W P-5'-P-CCNC: 33 U/L (ref 0–54)
ANION GAP SERPL CALCULATED.3IONS-SCNC: 13 MMOL/L (ref 4–13)
AST SERPL-CCNC: 37 U/L (ref 7–45)
BASOPHILS # BLD AUTO: 0.03 10*3/MM3 (ref 0–0.2)
BASOPHILS NFR BLD AUTO: 0.3 % (ref 0–2)
BILIRUB SERPL-MCNC: 1.5 MG/DL (ref 0.1–1)
BILIRUB UR QL STRIP: NEGATIVE
BUN BLD-MCNC: 19 MG/DL (ref 5–21)
BUN/CREAT SERPL: 19 (ref 7–25)
CALCIUM SPEC-SCNC: 9.4 MG/DL (ref 8.4–10.4)
CHLORIDE SERPL-SCNC: 104 MMOL/L (ref 98–110)
CLARITY UR: CLEAR
CO2 SERPL-SCNC: 28 MMOL/L (ref 24–31)
COLOR UR: YELLOW
CREAT BLD-MCNC: 1 MG/DL (ref 0.5–1.4)
DEPRECATED RDW RBC AUTO: 39.6 FL (ref 40–54)
EOSINOPHIL # BLD AUTO: 0.02 10*3/MM3 (ref 0–0.7)
EOSINOPHIL NFR BLD AUTO: 0.2 % (ref 0–4)
ERYTHROCYTE [DISTWIDTH] IN BLOOD BY AUTOMATED COUNT: 12.9 % (ref 12–15)
FLUAV AG NPH QL: NEGATIVE
FLUBV AG NPH QL IA: NEGATIVE
GFR SERPL CREATININE-BSD FRML MDRD: 85 ML/MIN/1.73
GLOBULIN UR ELPH-MCNC: 3 GM/DL
GLUCOSE BLD-MCNC: 121 MG/DL (ref 70–100)
GLUCOSE UR STRIP-MCNC: NEGATIVE MG/DL
HCT VFR BLD AUTO: 47.2 % (ref 40–52)
HGB BLD-MCNC: 16.2 G/DL (ref 14–18)
HGB UR QL STRIP.AUTO: NEGATIVE
HOLD SPECIMEN: NORMAL
HOLD SPECIMEN: NORMAL
IMM GRANULOCYTES # BLD: 0.04 10*3/MM3 (ref 0–0.03)
IMM GRANULOCYTES NFR BLD: 0.5 % (ref 0–5)
KETONES UR QL STRIP: ABNORMAL
LEUKOCYTE ESTERASE UR QL STRIP.AUTO: NEGATIVE
LIPASE SERPL-CCNC: 68 U/L (ref 23–203)
LYMPHOCYTES # BLD AUTO: 0.3 10*3/MM3 (ref 0.72–4.86)
LYMPHOCYTES NFR BLD AUTO: 3.4 % (ref 15–45)
MCH RBC QN AUTO: 28.8 PG (ref 28–32)
MCHC RBC AUTO-ENTMCNC: 34.3 G/DL (ref 33–36)
MCV RBC AUTO: 83.8 FL (ref 82–95)
MONOCYTES # BLD AUTO: 0.27 10*3/MM3 (ref 0.19–1.3)
MONOCYTES NFR BLD AUTO: 3 % (ref 4–12)
NEUTROPHILS # BLD AUTO: 8.21 10*3/MM3 (ref 1.87–8.4)
NEUTROPHILS NFR BLD AUTO: 92.6 % (ref 39–78)
NITRITE UR QL STRIP: NEGATIVE
NRBC BLD MANUAL-RTO: 0 /100 WBC (ref 0–0)
PH UR STRIP.AUTO: <=5 [PH] (ref 5–8)
PLATELET # BLD AUTO: 179 10*3/MM3 (ref 130–400)
PMV BLD AUTO: 12 FL (ref 6–12)
POTASSIUM BLD-SCNC: 3.9 MMOL/L (ref 3.5–5.3)
PROT SERPL-MCNC: 7.5 G/DL (ref 6.3–8.7)
PROT UR QL STRIP: NEGATIVE
RBC # BLD AUTO: 5.63 10*6/MM3 (ref 4.8–5.9)
S PYO AG THROAT QL: NEGATIVE
SODIUM BLD-SCNC: 145 MMOL/L (ref 135–145)
SP GR UR STRIP: >1.03 (ref 1–1.03)
UROBILINOGEN UR QL STRIP: ABNORMAL
WBC NRBC COR # BLD: 8.87 10*3/MM3 (ref 4.8–10.8)
WHOLE BLOOD HOLD SPECIMEN: NORMAL
WHOLE BLOOD HOLD SPECIMEN: NORMAL

## 2018-04-20 PROCEDURE — 96374 THER/PROPH/DIAG INJ IV PUSH: CPT

## 2018-04-20 PROCEDURE — 74022 RADEX COMPL AQT ABD SERIES: CPT

## 2018-04-20 PROCEDURE — 87880 STREP A ASSAY W/OPTIC: CPT | Performed by: NURSE PRACTITIONER

## 2018-04-20 PROCEDURE — 87804 INFLUENZA ASSAY W/OPTIC: CPT | Performed by: NURSE PRACTITIONER

## 2018-04-20 PROCEDURE — 96361 HYDRATE IV INFUSION ADD-ON: CPT

## 2018-04-20 PROCEDURE — 99284 EMERGENCY DEPT VISIT MOD MDM: CPT

## 2018-04-20 PROCEDURE — 83690 ASSAY OF LIPASE: CPT | Performed by: NURSE PRACTITIONER

## 2018-04-20 PROCEDURE — 85025 COMPLETE CBC W/AUTO DIFF WBC: CPT | Performed by: NURSE PRACTITIONER

## 2018-04-20 PROCEDURE — 80053 COMPREHEN METABOLIC PANEL: CPT | Performed by: NURSE PRACTITIONER

## 2018-04-20 PROCEDURE — 87040 BLOOD CULTURE FOR BACTERIA: CPT | Performed by: NURSE PRACTITIONER

## 2018-04-20 PROCEDURE — 25010000002 ONDANSETRON PER 1 MG: Performed by: NURSE PRACTITIONER

## 2018-04-20 PROCEDURE — 87081 CULTURE SCREEN ONLY: CPT | Performed by: NURSE PRACTITIONER

## 2018-04-20 PROCEDURE — 96375 TX/PRO/DX INJ NEW DRUG ADDON: CPT

## 2018-04-20 PROCEDURE — 81003 URINALYSIS AUTO W/O SCOPE: CPT | Performed by: NURSE PRACTITIONER

## 2018-04-20 RX ORDER — ONDANSETRON 4 MG/1
4 TABLET, ORALLY DISINTEGRATING ORAL EVERY 6 HOURS PRN
Qty: 10 TABLET | Refills: 0 | Status: SHIPPED | OUTPATIENT
Start: 2018-04-20 | End: 2018-06-26

## 2018-04-20 RX ORDER — ONDANSETRON 2 MG/ML
4 INJECTION INTRAMUSCULAR; INTRAVENOUS ONCE
Status: COMPLETED | OUTPATIENT
Start: 2018-04-20 | End: 2018-04-20

## 2018-04-20 RX ORDER — SODIUM CHLORIDE 0.9 % (FLUSH) 0.9 %
10 SYRINGE (ML) INJECTION AS NEEDED
Status: DISCONTINUED | OUTPATIENT
Start: 2018-04-20 | End: 2018-04-21 | Stop reason: HOSPADM

## 2018-04-20 RX ORDER — ACETAMINOPHEN 500 MG
1000 TABLET ORAL ONCE
Status: COMPLETED | OUTPATIENT
Start: 2018-04-20 | End: 2018-04-20

## 2018-04-20 RX ORDER — IBUPROFEN 800 MG/1
800 TABLET ORAL ONCE
Status: COMPLETED | OUTPATIENT
Start: 2018-04-20 | End: 2018-04-20

## 2018-04-20 RX ORDER — NAPROXEN 500 MG/1
500 TABLET ORAL EVERY 12 HOURS PRN
Qty: 20 TABLET | Refills: 0 | Status: SHIPPED | OUTPATIENT
Start: 2018-04-20 | End: 2018-06-26

## 2018-04-20 RX ORDER — KETOROLAC TROMETHAMINE 15 MG/ML
15 INJECTION, SOLUTION INTRAMUSCULAR; INTRAVENOUS ONCE
Status: DISCONTINUED | OUTPATIENT
Start: 2018-04-20 | End: 2018-04-20

## 2018-04-20 RX ADMIN — IBUPROFEN 800 MG: 800 TABLET, FILM COATED ORAL at 23:07

## 2018-04-20 RX ADMIN — ONDANSETRON 4 MG: 2 INJECTION INTRAMUSCULAR; INTRAVENOUS at 21:43

## 2018-04-20 RX ADMIN — ACETAMINOPHEN 1000 MG: 500 TABLET ORAL at 22:59

## 2018-04-20 RX ADMIN — SODIUM CHLORIDE 1000 ML: 9 INJECTION, SOLUTION INTRAVENOUS at 21:26

## 2018-04-21 VITALS
BODY MASS INDEX: 28 KG/M2 | TEMPERATURE: 99.2 F | HEIGHT: 71 IN | OXYGEN SATURATION: 96 % | RESPIRATION RATE: 18 BRPM | HEART RATE: 96 BPM | DIASTOLIC BLOOD PRESSURE: 71 MMHG | WEIGHT: 200 LBS | SYSTOLIC BLOOD PRESSURE: 110 MMHG

## 2018-04-21 PROCEDURE — 86666 EHRLICHIA ANTIBODY: CPT | Performed by: NURSE PRACTITIONER

## 2018-04-21 PROCEDURE — 86757 RICKETTSIA ANTIBODY: CPT | Performed by: NURSE PRACTITIONER

## 2018-04-21 PROCEDURE — 86617 LYME DISEASE ANTIBODY: CPT | Performed by: NURSE PRACTITIONER

## 2018-04-21 RX ORDER — DOXYCYCLINE 100 MG/1
100 CAPSULE ORAL 2 TIMES DAILY
Qty: 20 CAPSULE | Refills: 0 | Status: SHIPPED | OUTPATIENT
Start: 2018-04-21 | End: 2018-05-01

## 2018-04-21 NOTE — ED PROVIDER NOTES
Subjective   Mr. Garcia is a 38-year-old male patient who presents today with complaints of an acute onset of vomiting, headache, joint pain, fever that started earlier today.  He states that he woke this morning with some feelings of nausea, ran errands with his daughter and when he arrived home he had several emesis.  Patient states that he quickly felt weak and started having body aches.  Patient states he had ~ 7 episodes of emesis.  He has been able to keep sips of liquid down.  He states he has had a frontal headache as gradual in onset become more severe as today.  He has not been able to hold any medications down for it.  He has tried a Zofran ODT without any improvement.  He states that he had nasal allergies and sinus congestion used Flonase yesterday without difficulty.  He denies any known recent contacts with illness.            Review of Systems   Constitutional: Negative for chills, fatigue and fever.   HENT: Positive for congestion. Negative for rhinorrhea, sore throat and voice change.    Eyes: Negative for discharge and visual disturbance.   Respiratory: Negative for cough, shortness of breath and wheezing.    Cardiovascular: Negative for chest pain.   Gastrointestinal: Positive for nausea and vomiting. Negative for abdominal distention, abdominal pain and diarrhea.   Endocrine: Negative.    Genitourinary: Negative.  Negative for decreased urine volume and difficulty urinating.   Musculoskeletal: Positive for arthralgias and myalgias. Negative for back pain and neck pain.   Skin: Negative.  Negative for rash and wound.   Allergic/Immunologic: Negative.    Neurological: Positive for headaches. Negative for weakness.   Hematological: Negative.    Psychiatric/Behavioral: Negative.        Past Medical History:   Diagnosis Date   • Cervical herniated disc    • Lumbar herniated disc        Allergies   Allergen Reactions   • Topamax [Topiramate] Unknown (See Comments)     Patient states he has blurry  "vision   • Toradol [Ketorolac Tromethamine] Palpitations       Past Surgical History:   Procedure Laterality Date   • ANKLE SURGERY     • FEMUR SURGERY     • MANDIBLE FRACTURE SURGERY         Family History   Problem Relation Age of Onset   • Heart disease Paternal Grandmother        Social History     Social History   • Marital status:      Social History Main Topics   • Smoking status: Never Smoker   • Smokeless tobacco: Current User     Types: Chew   • Alcohol use No   • Drug use: No   • Sexual activity: Defer     Other Topics Concern   • Not on file       /71   Pulse 96   Temp 99.2 °F (37.3 °C) (Oral)   Resp 18   Ht 180.3 cm (71\")   Wt 90.7 kg (200 lb)   SpO2 96%   BMI 27.89 kg/m²       Objective   Physical Exam   Constitutional: He is oriented to person, place, and time. He appears well-developed and well-nourished.   HENT:   Head: Normocephalic and atraumatic.   Right Ear: Tympanic membrane and external ear normal.   Left Ear: Tympanic membrane and external ear normal.   Nose: Nose normal.   Mouth/Throat: Uvula is midline. Posterior oropharyngeal erythema present. No posterior oropharyngeal edema.   Eyes: EOM are normal. Pupils are equal, round, and reactive to light.   Neck: Normal range of motion. Neck supple.   Cardiovascular: Normal rate and regular rhythm.    Pulmonary/Chest: Effort normal and breath sounds normal.   Abdominal: Soft. Normal appearance and bowel sounds are normal. He exhibits no distension. There is no tenderness. There is no rigidity, no rebound and no guarding.   Musculoskeletal: Normal range of motion. He exhibits no tenderness.   Neurological: He is alert and oriented to person, place, and time.   Skin: Skin is warm and dry.   Nursing note and vitals reviewed.      Procedures  Labs Reviewed   COMPREHENSIVE METABOLIC PANEL - Abnormal; Notable for the following:        Result Value    Glucose 121 (*)     Total Bilirubin 1.5 (*)     All other components within normal " limits   URINALYSIS W/ CULTURE IF INDICATED - Abnormal; Notable for the following:     Specific Gravity, UA >1.030 (*)     Ketones, UA 15 mg/dL (1+) (*)     All other components within normal limits    Narrative:     Urine microscopic not indicated.   CBC WITH AUTO DIFFERENTIAL - Abnormal; Notable for the following:     RDW-SD 39.6 (*)     Neutrophil % 92.6 (*)     Lymphocyte % 3.4 (*)     Monocyte % 3.0 (*)     Lymphocytes, Absolute 0.30 (*)     Immature Grans, Absolute 0.04 (*)     All other components within normal limits   INFLUENZA ANTIGEN, RAPID - Normal    Narrative:     Recommend confirmation of negative results by viral culture or molecular assay.   RAPID STREP A SCREEN - Normal   LIPASE - Normal   BLOOD CULTURE   BLOOD CULTURE   BETA HEMOLYTIC STREP CULTURE, THROAT   RAINBOW DRAW    Narrative:     The following orders were created for panel order [facility].  Procedure                               Abnormality         Status                     ---------                               -----------         ------                     Light Blue Top[[id number]]                                   Final result               Green Top (Gel)[[id number]]                                  Final result               Lavender Top[[id number]]                                     Final result               Red Top[[id number]]                                          Final result                 Please view results for these tests on the individual orders.   RAINBOW DRAW    Narrative:     The following orders were created for panel order [facility].  Procedure                               Abnormality         Status                     ---------                               -----------         ------                     Light Blue Top[[id number]]                                                              Green Top (Gel)[[id number]]                                                             Lavender Top[[id number]]                                                                 Red Top[[id number]]                                                                       Please view results for these tests on the individual orders.   [facility] (IGG/M)   LYME DISEASE, WESTERN BLOT   HUMAN GRAN. EHRLICHIOSIS (IGG)   LIGHT BLUE TOP   GREEN TOP   LAVENDER TOP   RED TOP   CBC AND DIFFERENTIAL    Narrative:     The following orders were created for panel order CBC & Differential.  Procedure                               Abnormality         Status                     ---------                               -----------         ------                     CBC Auto Differential[[id number]]        Abnormal            Final result                 Please view results for these tests on the individual orders.   LIGHT BLUE TOP   GREEN TOP   LAVENDER TOP   RED TOP     Medications   sodium chloride 0.9 % flush 10 mL (not administered)   sodium chloride 0.9 % bolus 1,000 mL (0 mL Intravenous Stopped 4/20/18 2240)   ondansetron (ZOFRAN) injection 4 mg (4 mg Intravenous Given 4/20/18 2143)   acetaminophen (TYLENOL) tablet 1,000 mg (1,000 mg Oral Given 4/20/18 2259)   ibuprofen (ADVIL,MOTRIN) tablet 800 mg (800 mg Oral Given 4/20/18 2307)     XR Abdomen 2 View With Chest 1 View   Final Result   1.  Non-obstructive bowel gas pattern.       2.  No acute cardiopulmonary disease.           This report was finalized on 04/20/2018 21:23 by Dr. Kailee Juárez MD.               ED Course  ED Course   Comment By Time   Laboratory results reviewed with patient.  Abdomen reexamined and remains nontender, joint remained nontender.  Patient states he is feeling much improved with IV fluids thus far.  He states headache remains though is improved as well. Johana Chavez, APRN 04/20 2200   Patient states he feels much better he would like to attempt to take oral fluids at this time. Declines offer of another bag of IV fluids.  We will attempt Tylenol and ibuprofen  for his headache body aches patient declined IV Toradol. Johana Chavez, APRN 04/20 4476   Discussed patient history and presentation with Dr. Holley.  We will treat for tickborne illness.  Patient has requested that we draw titers this time he is instructed how to follow-up for his blood work. Johana Chavez, APRN 04/21 0039                  [facility]    Final diagnoses:   Non-intractable vomiting with nausea, unspecified vomiting type            Johana Chavez, APRN  04/21/18 0040

## 2018-04-21 NOTE — ED NOTES
C/o's 'been sick all day, all my joints down my legs feel weak, vomiting 6-7 times today, tried to drink a sprite, it came up, have a real bad headache.' 'Felt okay yesterday, got some Flonase.'      Maricruz Zambrano, RN  04/20/18 1932

## 2018-04-21 NOTE — DISCHARGE INSTRUCTIONS
symptoms at this time appear to be from viral illness. This should resolve within 72 hours, if symptoms persist or if there is high fever, increased pain or blood in vomit/stool, seek immediate medical care. Increase fluid intake, drink 8 oz of liquid an hour. I suggest when attempting food to avoid dairy and start with bananas, applesauce, dry toast, ect. Monitor for signs/symptoms of dehydration . Maintain careful hygiene.  Any new or worsening symptoms you may return to emergency room

## 2018-04-23 LAB
BACTERIA SPEC AEROBE CULT: ABNORMAL
CONV HGE IGG TITER: NEGATIVE

## 2018-04-24 LAB
B BURGDOR IGG PATRN SER IB-IMP: NEGATIVE
B BURGDOR IGM PATRN SER IB-IMP: NEGATIVE
B BURGDOR18KD IGG SER QL IB: ABNORMAL
B BURGDOR23KD IGG SER QL IB: ABNORMAL
B BURGDOR23KD IGM SER QL IB: PRESENT
B BURGDOR28KD IGG SER QL IB: ABNORMAL
B BURGDOR30KD IGG SER QL IB: ABNORMAL
B BURGDOR39KD IGG SER QL IB: ABNORMAL
B BURGDOR39KD IGM SER QL IB: ABNORMAL
B BURGDOR41KD IGG SER QL IB: PRESENT
B BURGDOR41KD IGM SER QL IB: ABNORMAL
B BURGDOR45KD IGG SER QL IB: ABNORMAL
B BURGDOR58KD IGG SER QL IB: ABNORMAL
B BURGDOR66KD IGG SER QL IB: PRESENT
B BURGDOR93KD IGG SER QL IB: ABNORMAL
R RICKETTSI IGG SER QL IA: ABNORMAL
R RICKETTSI IGG SER QL IA: POSITIVE
R RICKETTSI IGM TITR SER: 0.61 INDEX (ref 0–0.89)

## 2018-04-25 ENCOUNTER — TELEPHONE (OUTPATIENT)
Dept: EMERGENCY DEPT | Facility: HOSPITAL | Age: 38
End: 2018-04-25

## 2018-04-25 LAB
BACTERIA SPEC AEROBE CULT: NORMAL
BACTERIA SPEC AEROBE CULT: NORMAL

## 2018-04-25 NOTE — TELEPHONE ENCOUNTER
----- Message from NORRIS Seo sent at 4/24/2018  8:01 PM CDT -----  Continue current medications and follow up with pcp   ----- Message -----  From: Lab, Background User  Sent: 4/23/2018   6:36 AM  To: Bh Pad Asap In Basket Results Pool

## 2018-06-26 ENCOUNTER — OFFICE VISIT (OUTPATIENT)
Dept: UROLOGY | Facility: CLINIC | Age: 38
End: 2018-06-26

## 2018-06-26 VITALS — WEIGHT: 205.8 LBS | BODY MASS INDEX: 28.81 KG/M2 | HEIGHT: 71 IN

## 2018-06-26 DIAGNOSIS — N43.40 SPERMATOCELE: ICD-10-CM

## 2018-06-26 DIAGNOSIS — I86.1 VARICOCELE: Primary | ICD-10-CM

## 2018-06-26 LAB
BILIRUB BLD-MCNC: NEGATIVE MG/DL
CLARITY, POC: CLEAR
COLOR UR: YELLOW
GLUCOSE UR STRIP-MCNC: NEGATIVE MG/DL
KETONES UR QL: NEGATIVE
LEUKOCYTE EST, POC: NEGATIVE
NITRITE UR-MCNC: NEGATIVE MG/ML
PH UR: 5.5 [PH] (ref 5–8)
PROT UR STRIP-MCNC: NEGATIVE MG/DL
RBC # UR STRIP: NEGATIVE /UL
SP GR UR: 1.03 (ref 1–1.03)
UROBILINOGEN UR QL: NORMAL

## 2018-06-26 PROCEDURE — 99203 OFFICE O/P NEW LOW 30 MIN: CPT | Performed by: UROLOGY

## 2018-06-26 NOTE — PROGRESS NOTES
Subjective    Mr. Garcia is 38 y.o. male    CHIEF COMPLAINT: Varicocele    The patient was sent to us because he was told that an area in his right testicle was consistent with a varicocele he said he noticed a 3-4 months ago is maybe gotten a little bit larger but not a lot but he does notice some referred pain to me back up inguinal area and he has not had a vasectomy.    He has had 2 children he has not noticed anything on the left he does not have any other symptoms such as urgency frequency of any significance gross hematuria recurrent urinary tract infections etc.    It is somewhat uncomfortable but the patient can intermittent once again radiates up into the inguinal area      History of Present Illness      The following portions of the patient's history were reviewed and updated as appropriate: allergies, current medications, past family history, past medical history, past social history, past surgical history and problem list.    Review of Systems   Constitutional: Positive for fatigue. Negative for appetite change, chills and fever.   HENT: Negative for hearing loss and sore throat.    Eyes: Negative for pain and redness.   Respiratory: Negative for cough and shortness of breath.    Cardiovascular: Negative for chest pain and leg swelling.   Gastrointestinal: Negative for abdominal pain, anal bleeding, blood in stool, nausea and vomiting.   Endocrine: Negative for cold intolerance and heat intolerance.   Genitourinary: Positive for frequency. Negative for difficulty urinating, dysuria, flank pain, hematuria, testicular pain and urgency.   Musculoskeletal: Negative for joint swelling and myalgias.   Skin: Negative for color change and rash.   Allergic/Immunologic: Positive for food allergies. Negative for immunocompromised state.   Neurological: Negative for dizziness and speech difficulty.   Hematological: Negative for adenopathy. Does not bruise/bleed easily.   Psychiatric/Behavioral: Negative for  "dysphoric mood and suicidal ideas.       No current outpatient prescriptions on file.    Past Medical History:   Diagnosis Date   • Cervical herniated disc    • Lumbar herniated disc    • Varicocele        Past Surgical History:   Procedure Laterality Date   • ANKLE SURGERY     • FEMUR SURGERY     • MANDIBLE FRACTURE SURGERY         Social History     Social History   • Marital status:      Social History Main Topics   • Smoking status: Former Smoker   • Smokeless tobacco: Current User     Types: Chew   • Alcohol use No   • Drug use: No   • Sexual activity: Defer     Other Topics Concern   • Not on file       Family History   Problem Relation Age of Onset   • Heart disease Paternal Grandmother    • Cancer Neg Hx        Objective    Ht 180.3 cm (71\")   Wt 93.4 kg (205 lb 12.8 oz)   BMI 28.70 kg/m²     Physical Exam   Constitutional: He is oriented to person, place, and time. He appears well-developed and well-nourished. No distress.   HENT:   Head: Normocephalic and atraumatic.   Nose: Nose normal.   Eyes: Conjunctivae are normal. No scleral icterus.   Neck: Normal range of motion. Neck supple. No tracheal deviation present. No thyromegaly present.   Cardiovascular: Normal rate and regular rhythm.    No significant edema or tenderness    Pulmonary/Chest: Effort normal. No accessory muscle usage. No respiratory distress.   Abdominal: Soft. Bowel sounds are normal. He exhibits no distension, no ascites and no mass. There is no hepatosplenomegaly. There is no tenderness. There is no rebound, no guarding and no CVA tenderness. No hernia. Hernia confirmed negative in the right inguinal area and confirmed negative in the left inguinal area.   Stool specimen is not indicated for my portion of the exam   Genitourinary: Penis normal. Rectal exam shows no mass, no tenderness, anal tone normal and guaiac negative stool. Tender: no nodules. Right testis shows mass, swelling and tenderness. Left testis shows no mass, no " swelling and no tenderness. Circumcised. No penile tenderness (no lesion or deformities).   Genitourinary Comments:  The urethral meatus normal in position without evidence of stricture. Epididymis without mass or tenderness. Vas Deferens is palpably normal.   Lymphadenopathy:     He has no cervical adenopathy. No inguinal adenopathy noted on the right or left side.        Right: No inguinal adenopathy present.        Left: No inguinal adenopathy present.   Neurological: He is alert and oriented to person, place, and time.   Skin: Skin is warm and dry. No rash noted. He is not diaphoretic. No pallor.   Psychiatric: He has a normal mood and affect. His behavior is normal.   Vitals reviewed.        Admission on 04/20/2018, Discharged on 04/21/2018   Component Date Value Ref Range Status   • Extra Tube 04/20/2018 hold for add-on   Final    Auto resulted   • Extra Tube 04/20/2018 Hold for add-ons.   Final    Auto resulted.   • Extra Tube 04/20/2018 hold for add-on   Final    Auto resulted   • Extra Tube 04/20/2018 Hold for add-ons.   Final    Auto resulted.   • Glucose 04/20/2018 121* 70 - 100 mg/dL Final   • BUN 04/20/2018 19  5 - 21 mg/dL Final   • Creatinine 04/20/2018 1.00  0.50 - 1.40 mg/dL Final   • Sodium 04/20/2018 145  135 - 145 mmol/L Final   • Potassium 04/20/2018 3.9  3.5 - 5.3 mmol/L Final   • Chloride 04/20/2018 104  98 - 110 mmol/L Final   • CO2 04/20/2018 28.0  24.0 - 31.0 mmol/L Final   • Calcium 04/20/2018 9.4  8.4 - 10.4 mg/dL Final   • Total Protein 04/20/2018 7.5  6.3 - 8.7 g/dL Final   • Albumin 04/20/2018 4.50  3.50 - 5.00 g/dL Final   • ALT (SGPT) 04/20/2018 33  0 - 54 U/L Final   • AST (SGOT) 04/20/2018 37  7 - 45 U/L Final   • Alkaline Phosphatase 04/20/2018 71  24 - 120 U/L Final   • Total Bilirubin 04/20/2018 1.5* 0.1 - 1.0 mg/dL Final   • eGFR Non African Amer 04/20/2018 85  >60 mL/min/1.73 Final   • Globulin 04/20/2018 3.0  gm/dL Final   • A/G Ratio 04/20/2018 1.5  1.1 - 2.5 g/dL Final   •  BUN/Creatinine Ratio 04/20/2018 19.0  7.0 - 25.0 Final   • Anion Gap 04/20/2018 13.0  4.0 - 13.0 mmol/L Final   • Lipase 04/20/2018 68  23 - 203 U/L Final   • Color, UA 04/20/2018 Yellow  Yellow, Straw Final   • Appearance, UA 04/20/2018 Clear  Clear Final   • pH, UA 04/20/2018 <=5.0  5.0 - 8.0 Final   • Specific Gravity, UA 04/20/2018 >1.030* 1.005 - 1.030 Final   • Glucose, UA 04/20/2018 Negative  Negative Final   • Ketones, UA 04/20/2018 15 mg/dL (1+)* Negative Final   • Bilirubin, UA 04/20/2018 Negative  Negative Final   • Blood, UA 04/20/2018 Negative  Negative Final   • Protein, UA 04/20/2018 Negative  Negative Final   • Leuk Esterase, UA 04/20/2018 Negative  Negative Final   • Nitrite, UA 04/20/2018 Negative  Negative Final   • Urobilinogen, UA 04/20/2018 0.2 E.U./dL  0.2 - 1.0 E.U./dL Final   • Influenza A Ag, EIA 04/20/2018 Negative  Negative Final   • Influenza B Ag, EIA 04/20/2018 Negative  Negative Final   • Strep A Ag 04/20/2018 Negative  Negative Final   • WBC 04/20/2018 8.87  4.80 - 10.80 10*3/mm3 Final   • RBC 04/20/2018 5.63  4.80 - 5.90 10*6/mm3 Final   • Hemoglobin 04/20/2018 16.2  14.0 - 18.0 g/dL Final   • Hematocrit 04/20/2018 47.2  40.0 - 52.0 % Final   • MCV 04/20/2018 83.8  82.0 - 95.0 fL Final   • MCH 04/20/2018 28.8  28.0 - 32.0 pg Final   • MCHC 04/20/2018 34.3  33.0 - 36.0 g/dL Final   • RDW 04/20/2018 12.9  12.0 - 15.0 % Final   • RDW-SD 04/20/2018 39.6* 40.0 - 54.0 fl Final   • MPV 04/20/2018 12.0  6.0 - 12.0 fL Final   • Platelets 04/20/2018 179  130 - 400 10*3/mm3 Final   • Neutrophil % 04/20/2018 92.6* 39.0 - 78.0 % Final   • Lymphocyte % 04/20/2018 3.4* 15.0 - 45.0 % Final   • Monocyte % 04/20/2018 3.0* 4.0 - 12.0 % Final   • Eosinophil % 04/20/2018 0.2  0.0 - 4.0 % Final   • Basophil % 04/20/2018 0.3  0.0 - 2.0 % Final   • Immature Grans % 04/20/2018 0.5  0.0 - 5.0 % Final   • Neutrophils, Absolute 04/20/2018 8.21  1.87 - 8.40 10*3/mm3 Final   • Lymphocytes, Absolute 04/20/2018  0.30* 0.72 - 4.86 10*3/mm3 Final   • Monocytes, Absolute 04/20/2018 0.27  0.19 - 1.30 10*3/mm3 Final   • Eosinophils, Absolute 04/20/2018 0.02  0.00 - 0.70 10*3/mm3 Final   • Basophils, Absolute 04/20/2018 0.03  0.00 - 0.20 10*3/mm3 Final   • Immature Grans, Absolute 04/20/2018 0.04* 0.00 - 0.03 10*3/mm3 Final   • nRBC 04/20/2018 0.0  0.0 - 0.0 /100 WBC Final   • Blood Culture 04/20/2018 No growth at 5 days   Final   • Blood Culture 04/20/2018 No growth at 5 days   Final   • Throat Culture, Beta Strep 04/20/2018 Moderate growth (3+) Streptococcus, Beta Hemolytic, Not Group A*  Final   • Throat Culture, Beta Strep 04/20/2018 Heavy growth (4+) Normal Respiratory Debbie   Final   • RMSF IgG 04/21/2018 Positive* Negative Final   • RMSF IgM 04/21/2018 0.61  0.00 - 0.89 index Final                                     Negative        <0.90                                   Equivocal 0.90 - 1.10                                   Positive        >1.10   • IgG P93 Ab. 04/21/2018 Absent   Final   • IgG P66 Ab. 04/21/2018 Present*  Final   • IgG P58 Ab. 04/21/2018 Absent   Final   • IgG P45 Ab. 04/21/2018 Absent   Final   • IgG P41 Ab. 04/21/2018 Present*  Final   • IgG P39 Ab. 04/21/2018 Absent   Final   • IgG P30 Ab. 04/21/2018 Absent   Final   • IgG P28 Ab. 04/21/2018 Absent   Final   • IgG P23 Ab. 04/21/2018 Absent   Final   • IgG P18 Ab. 04/21/2018 Absent   Final   • Lyme IgG Western Blot Interpretati* 04/21/2018 Negative   Final                         Positive: 5 of the following                                 Borrelia-specific bands:                                 18,23,28,30,39,41,45,58,                                 66, and 93.                       Negative: No bands or banding                                 patterns which do not                                 meet positive criteria.   • IgM P41 Ab. 04/21/2018 Absent   Final   • IgM P39 Ab. 04/21/2018 Absent   Final   • IgM P23 Ab. 04/21/2018 Present*  Final   •  Lyme IgM Western Blot Interpretati* 04/21/2018 Negative   Final    Note: An equivocal or positive EIA result followed by a negative  Western Blot result is considered NEGATIVE. An equivocal or positive  EIA result followed by a positive Western Blot is considered POSITIVE  by the CDC.  Positive: 2 of the following bands: 23,39 or 41  Negative: No bands or banding patterns which do not meet positive  criteria.  Criteria for positivity are those recommended by CDC/ASTPHLD.  p23=Osp C, u08=[id number]  Note:  Sera from individuals with the following may cross react in the  Lyme Western Blot assays: other spirochetal diseases (periodontal  disease, leptospirosis, relapsing fever, yaws, and pinta);  connective autoimmune (Rheumatoid Arthritis and Systemic Lupus  Erythematosus and also individuals with Antinuclear Antibody);  other infections ([place] Spotted Fever; Liana-Barr Virus,  and Cytomegalovirus).   • HGE IgG Titer 04/21/2018 Negative  Neg:<1:64 Final    HGE IgG levels are detectable 7 to 10 days post infection and persist  approximately one year.   • RMSF IgG 04/21/2018 1:256* Neg <1:64 Final                                 Negative           <1:64                               Positive            1:64                               Recent/Active      >1:64  Titers of 1:64 are suggestive of past or possible  current infection. Titers >1:64 are suggestive of  recent or active infection. Approximately 9% of  specimens positive by EIA screen are negative by IFA.       Results for orders placed or performed in visit on 06/26/18   POC Urinalysis Dipstick, Multipro   Result Value Ref Range    Color Yellow Yellow, Straw, Dark Yellow, Elinor    Clarity, UA Clear Clear    Glucose, UA Negative Negative, 1000 mg/dL (3+) mg/dL    Bilirubin Negative Negative    Ketones, UA Negative Negative    Specific Gravity  1.030 1.005 - 1.030    Blood, UA Negative Negative    pH, Urine 5.5 5.0 - 8.0    Protein, POC Negative  Negative mg/dL    Urobilinogen, UA Normal Normal    Nitrite, UA Negative Negative    Leukocytes Negative Negative       Assessment and Plan    Luis was seen today for varicocele.    Diagnoses and all orders for this visit:    Varicocele  -     POC Urinalysis Dipstick, Multipro  -     US Scrotum & Testicles; Future    Spermatocele    Plan--I think the patient has a small right spermatocele to about 2 cm somewhat separate from the testicle and above the epididymis and testicle but I think that is what ideas.    I told him I do not think that he has a testicular cancer anything like that I did discuss for spermatocele with him at length.    , Seen back in a couple weeks with an ultrasound did not indicate to them that if he does not want it removed probably surgery would be the only option.    I thing of answered all his questions at this time

## 2018-07-02 ENCOUNTER — HOSPITAL ENCOUNTER (OUTPATIENT)
Dept: ULTRASOUND IMAGING | Facility: HOSPITAL | Age: 38
Discharge: HOME | End: 2018-07-02
Attending: UROLOGY | Admitting: UROLOGY

## 2018-07-02 DIAGNOSIS — I86.1 VARICOCELE: ICD-10-CM

## 2018-07-02 PROCEDURE — 76870 US EXAM SCROTUM: CPT

## 2018-08-02 PROCEDURE — 99284 EMERGENCY DEPT VISIT MOD MDM: CPT

## 2018-08-03 ENCOUNTER — HOSPITAL ENCOUNTER (EMERGENCY)
Facility: HOSPITAL | Age: 38
Discharge: HOME | End: 2018-08-03
Admitting: EMERGENCY MEDICINE

## 2018-08-03 VITALS
HEART RATE: 50 BPM | SYSTOLIC BLOOD PRESSURE: 124 MMHG | DIASTOLIC BLOOD PRESSURE: 65 MMHG | OXYGEN SATURATION: 95 % | HEIGHT: 71 IN | TEMPERATURE: 98 F | BODY MASS INDEX: 27.72 KG/M2 | RESPIRATION RATE: 16 BRPM | WEIGHT: 198 LBS

## 2018-08-03 DIAGNOSIS — M79.10 MYALGIA: ICD-10-CM

## 2018-08-03 DIAGNOSIS — R51.9 NONINTRACTABLE HEADACHE, UNSPECIFIED CHRONICITY PATTERN, UNSPECIFIED HEADACHE TYPE: Primary | ICD-10-CM

## 2018-08-03 DIAGNOSIS — R11.2 NAUSEA AND VOMITING, INTRACTABILITY OF VOMITING NOT SPECIFIED, UNSPECIFIED VOMITING TYPE: ICD-10-CM

## 2018-08-03 LAB
ALBUMIN SERPL-MCNC: 4.3 G/DL (ref 3.5–5)
ALBUMIN/GLOB SERPL: 1.5 G/DL (ref 1.1–2.5)
ALP SERPL-CCNC: 61 U/L (ref 24–120)
ALT SERPL W P-5'-P-CCNC: 36 U/L (ref 0–54)
ANION GAP SERPL CALCULATED.3IONS-SCNC: 11 MMOL/L (ref 4–13)
AST SERPL-CCNC: 24 U/L (ref 7–45)
BASOPHILS # BLD AUTO: 0.03 10*3/MM3 (ref 0–0.2)
BASOPHILS NFR BLD AUTO: 0.4 % (ref 0–2)
BILIRUB SERPL-MCNC: 0.9 MG/DL (ref 0.1–1)
BILIRUB UR QL STRIP: NEGATIVE
BUN BLD-MCNC: 13 MG/DL (ref 5–21)
BUN/CREAT SERPL: 13.7 (ref 7–25)
CALCIUM SPEC-SCNC: 9.6 MG/DL (ref 8.4–10.4)
CHLORIDE SERPL-SCNC: 103 MMOL/L (ref 98–110)
CLARITY UR: CLEAR
CO2 SERPL-SCNC: 29 MMOL/L (ref 24–31)
COLOR UR: YELLOW
CREAT BLD-MCNC: 0.95 MG/DL (ref 0.5–1.4)
D-LACTATE SERPL-SCNC: 0.9 MMOL/L (ref 0.5–2)
DEPRECATED RDW RBC AUTO: 38.4 FL (ref 40–54)
EOSINOPHIL # BLD AUTO: 0.03 10*3/MM3 (ref 0–0.7)
EOSINOPHIL NFR BLD AUTO: 0.4 % (ref 0–4)
ERYTHROCYTE [DISTWIDTH] IN BLOOD BY AUTOMATED COUNT: 12.9 % (ref 12–15)
GFR SERPL CREATININE-BSD FRML MDRD: 90 ML/MIN/1.73
GLOBULIN UR ELPH-MCNC: 2.9 GM/DL
GLUCOSE BLD-MCNC: 107 MG/DL (ref 70–100)
GLUCOSE UR STRIP-MCNC: NEGATIVE MG/DL
HCT VFR BLD AUTO: 45.2 % (ref 40–52)
HGB BLD-MCNC: 15.5 G/DL (ref 14–18)
HGB UR QL STRIP.AUTO: NEGATIVE
IMM GRANULOCYTES # BLD: 0.03 10*3/MM3 (ref 0–0.03)
IMM GRANULOCYTES NFR BLD: 0.4 % (ref 0–5)
KETONES UR QL STRIP: ABNORMAL
LEUKOCYTE ESTERASE UR QL STRIP.AUTO: NEGATIVE
LIPASE SERPL-CCNC: 47 U/L (ref 23–203)
LYMPHOCYTES # BLD AUTO: 1.9 10*3/MM3 (ref 0.72–4.86)
LYMPHOCYTES NFR BLD AUTO: 26 % (ref 15–45)
MCH RBC QN AUTO: 28.4 PG (ref 28–32)
MCHC RBC AUTO-ENTMCNC: 34.3 G/DL (ref 33–36)
MCV RBC AUTO: 82.9 FL (ref 82–95)
MONOCYTES # BLD AUTO: 0.31 10*3/MM3 (ref 0.19–1.3)
MONOCYTES NFR BLD AUTO: 4.2 % (ref 4–12)
NEUTROPHILS # BLD AUTO: 5 10*3/MM3 (ref 1.87–8.4)
NEUTROPHILS NFR BLD AUTO: 68.6 % (ref 39–78)
NITRITE UR QL STRIP: NEGATIVE
NRBC BLD MANUAL-RTO: 0 /100 WBC (ref 0–0)
PH UR STRIP.AUTO: 6.5 [PH] (ref 5–8)
PLATELET # BLD AUTO: 213 10*3/MM3 (ref 130–400)
PMV BLD AUTO: 11.7 FL (ref 6–12)
POTASSIUM BLD-SCNC: 4.2 MMOL/L (ref 3.5–5.3)
PROT SERPL-MCNC: 7.2 G/DL (ref 6.3–8.7)
PROT UR QL STRIP: NEGATIVE
RBC # BLD AUTO: 5.45 10*6/MM3 (ref 4.8–5.9)
SODIUM BLD-SCNC: 143 MMOL/L (ref 135–145)
SP GR UR STRIP: 1.01 (ref 1–1.03)
UROBILINOGEN UR QL STRIP: ABNORMAL
WBC NRBC COR # BLD: 7.3 10*3/MM3 (ref 4.8–10.8)

## 2018-08-03 PROCEDURE — 80053 COMPREHEN METABOLIC PANEL: CPT | Performed by: NURSE PRACTITIONER

## 2018-08-03 PROCEDURE — 96374 THER/PROPH/DIAG INJ IV PUSH: CPT

## 2018-08-03 PROCEDURE — 96361 HYDRATE IV INFUSION ADD-ON: CPT

## 2018-08-03 PROCEDURE — 25010000002 METOCLOPRAMIDE PER 10 MG: Performed by: NURSE PRACTITIONER

## 2018-08-03 PROCEDURE — 96375 TX/PRO/DX INJ NEW DRUG ADDON: CPT

## 2018-08-03 PROCEDURE — 85025 COMPLETE CBC W/AUTO DIFF WBC: CPT | Performed by: NURSE PRACTITIONER

## 2018-08-03 PROCEDURE — 25010000002 DIPHENHYDRAMINE PER 50 MG: Performed by: NURSE PRACTITIONER

## 2018-08-03 PROCEDURE — 83690 ASSAY OF LIPASE: CPT | Performed by: NURSE PRACTITIONER

## 2018-08-03 PROCEDURE — 83605 ASSAY OF LACTIC ACID: CPT | Performed by: NURSE PRACTITIONER

## 2018-08-03 PROCEDURE — 81003 URINALYSIS AUTO W/O SCOPE: CPT | Performed by: NURSE PRACTITIONER

## 2018-08-03 PROCEDURE — 25010000002 ONDANSETRON PER 1 MG: Performed by: NURSE PRACTITIONER

## 2018-08-03 PROCEDURE — 25010000002 MORPHINE SULFATE (PF) 2 MG/ML SOLUTION: Performed by: NURSE PRACTITIONER

## 2018-08-03 RX ORDER — ONDANSETRON 4 MG/1
4 TABLET, FILM COATED ORAL EVERY 6 HOURS PRN
Qty: 8 TABLET | Refills: 0 | Status: SHIPPED | OUTPATIENT
Start: 2018-08-03 | End: 2018-08-05

## 2018-08-03 RX ORDER — ONDANSETRON 2 MG/ML
4 INJECTION INTRAMUSCULAR; INTRAVENOUS ONCE
Status: COMPLETED | OUTPATIENT
Start: 2018-08-03 | End: 2018-08-03

## 2018-08-03 RX ORDER — SODIUM CHLORIDE 0.9 % (FLUSH) 0.9 %
10 SYRINGE (ML) INJECTION AS NEEDED
Status: DISCONTINUED | OUTPATIENT
Start: 2018-08-03 | End: 2018-08-03 | Stop reason: HOSPADM

## 2018-08-03 RX ORDER — METOCLOPRAMIDE HYDROCHLORIDE 5 MG/ML
10 INJECTION INTRAMUSCULAR; INTRAVENOUS ONCE
Status: COMPLETED | OUTPATIENT
Start: 2018-08-03 | End: 2018-08-03

## 2018-08-03 RX ORDER — MORPHINE SULFATE 2 MG/ML
2 INJECTION, SOLUTION INTRAMUSCULAR; INTRAVENOUS ONCE
Status: COMPLETED | OUTPATIENT
Start: 2018-08-03 | End: 2018-08-03

## 2018-08-03 RX ORDER — DIPHENHYDRAMINE HYDROCHLORIDE 50 MG/ML
12.5 INJECTION INTRAMUSCULAR; INTRAVENOUS ONCE
Status: COMPLETED | OUTPATIENT
Start: 2018-08-03 | End: 2018-08-03

## 2018-08-03 RX ADMIN — MORPHINE SULFATE 2 MG: 2 INJECTION, SOLUTION INTRAMUSCULAR; INTRAVENOUS at 01:27

## 2018-08-03 RX ADMIN — ONDANSETRON 4 MG: 2 INJECTION, SOLUTION INTRAMUSCULAR; INTRAVENOUS at 01:26

## 2018-08-03 RX ADMIN — SODIUM CHLORIDE 1000 ML: 9 INJECTION, SOLUTION INTRAVENOUS at 01:28

## 2018-08-03 RX ADMIN — DIPHENHYDRAMINE HYDROCHLORIDE 12.5 MG: 50 INJECTION, SOLUTION INTRAMUSCULAR; INTRAVENOUS at 03:09

## 2018-08-03 RX ADMIN — METOCLOPRAMIDE 10 MG: 5 INJECTION, SOLUTION INTRAMUSCULAR; INTRAVENOUS at 03:10

## 2018-08-03 NOTE — ED PROVIDER NOTES
Subjective   Patient is a 38-year-old  male that presents to the ER today with complaint of headache, nausea, and joint pain.  Patient reports this began around 10 AM this morning.  He states that he has vomited approximately 4-5 times.  The patient denies any chest pain or shortness of breath.  He denies any abdominal pain with this.  He denies any diarrhea, black or tarry stools, or constipation.  Patient denies fever.  Patient states he does have a history of headaches however does seem worse today.  He denies any recent head injury or trauma, he denies thunderclap headache, he denies visual changes.  Patient reports that he was seen at this ER in April 2018.  At that time he was diagnosed with Lyme disease and [place] spotted fever.  He has been on 2 rounds of doxycycline since that time.  He states that his symptoms today are similar to his symptoms at that time.  He states that he is having pain to his bilateral knees.  He denies any other joint pain.  He presents with his mother today for further evaluation.        History provided by:  Patient   used: No    Illness   Location:  Headache, nausea, joint pain  Severity:  Mild  Onset quality:  Sudden  Duration:  12 hours  Timing:  Constant  Progression:  Unchanged  Chronicity:  Recurrent  Associated symptoms: no abdominal pain, no chest pain, no congestion, no cough, no diarrhea, no ear pain, no fatigue, no fever, no headaches, no loss of consciousness, no myalgias, no nausea, no rash, no rhinorrhea, no shortness of breath, no sore throat, no vomiting and no wheezing        Review of Systems   Constitutional: Negative for fatigue and fever.   HENT: Negative for congestion, ear pain, rhinorrhea and sore throat.    Respiratory: Negative for cough, shortness of breath and wheezing.    Cardiovascular: Negative for chest pain.   Gastrointestinal: Negative for abdominal pain, diarrhea, nausea and vomiting.   Musculoskeletal:  Negative for myalgias.   Skin: Negative for rash.   Neurological: Negative for loss of consciousness and headaches.   All other systems reviewed and are negative.      Past Medical History:   Diagnosis Date   • Cervical herniated disc    • Lumbar herniated disc    • Lyme disease    • [place] spotted fever    • Varicocele        Allergies   Allergen Reactions   • Topamax [Topiramate] Unknown (See Comments)     Patient states he has blurry vision   • Toradol [Ketorolac Tromethamine] Palpitations       Past Surgical History:   Procedure Laterality Date   • ANKLE SURGERY     • FEMUR SURGERY     • MANDIBLE FRACTURE SURGERY         Family History   Problem Relation Age of Onset   • Heart disease Paternal Grandmother    • Cancer Neg Hx        Social History     Social History   • Marital status:      Social History Main Topics   • Smoking status: Former Smoker   • Smokeless tobacco: Current User     Types: Chew   • Alcohol use No   • Drug use: No   • Sexual activity: Defer     Other Topics Concern   • Not on file           Objective   Physical Exam   Constitutional: He is oriented to person, place, and time. He appears well-developed and well-nourished.   HENT:   Head: Normocephalic and atraumatic.   Eyes: Pupils are equal, round, and reactive to light. Conjunctivae and EOM are normal.   Neck: Normal range of motion and full passive range of motion without pain. Neck supple.   Cardiovascular: Normal rate, regular rhythm and normal heart sounds.    Pulmonary/Chest: Effort normal and breath sounds normal.   Abdominal: Soft. Bowel sounds are normal. There is no tenderness.   Neurological: He is alert and oriented to person, place, and time.   Skin: Skin is warm and dry. Capillary refill takes less than 2 seconds.   Psychiatric: He has a normal mood and affect.   Nursing note and vitals reviewed.      Procedures           ED Course  ED Course as of Aug 03 0348   Fri Aug 03, 2018   0240 The patient labs reviewed.  " His urinalysis shows a small amount of ketones.  He did receive 1 L of normal saline.  CBC shows a normal white blood cell count, normal hemoglobin and hematocrit.  Platelet count was normal.  ENT showed a normal renal function, normal liver enzymes.  The patient has been resting comfortably in the room.  When I initially went to examine him actually had awake enough to talk to him.  At this time will be discharged home in stable condition.  She reports to me that the symptoms are consistent to when his \"Lyme disease and [place] spotted fever\".  At this time he will be discharged home in stable condition.  He is advised to follow up his regular doctor in 1-2 days for recheck.  His last may return to the ER if any new or worsening symptoms.  I will give him a prescription for Zofran he can use for nausea.  The pt was given Benadryl and reglan as well for pain. The pt had a CT scan of the head and MRI of the brain in 2017 that was normal.   [LF]   0343 Pt seen and evlauated by Dr. Cooley who agrees with plan of care. Pt will be DC home at this time in stable cond; states he feels much better. Advised to f/u with PCP in 1-2 days for a recheck.   [LF]      ED Course User Index  [LF] Ruth Sagastume, NORRIS      Lab Results (last 24 hours)     Procedure Component Value Units Date/Time    CBC & Differential [[id number]] Collected:  08/03/18 0128    Specimen:  Blood Updated:  08/03/18 0137    Narrative:       The following orders were created for panel order CBC & Differential.  Procedure                               Abnormality         Status                     ---------                               -----------         ------                     CBC Auto Differential[[id number]]        Abnormal            Final result                 Please view results for these tests on the individual orders.    Comprehensive Metabolic Panel [[id number]]  (Abnormal) Collected:  08/03/18 0128    Specimen:  Blood Updated:  " 08/03/18 0153     Glucose 107 (H) mg/dL      BUN 13 mg/dL      Creatinine 0.95 mg/dL      Sodium 143 mmol/L      Potassium 4.2 mmol/L      Chloride 103 mmol/L      CO2 29.0 mmol/L      Calcium 9.6 mg/dL      Total Protein 7.2 g/dL      Albumin 4.30 g/dL      ALT (SGPT) 36 U/L      AST (SGOT) 24 U/L      Alkaline Phosphatase 61 U/L      Total Bilirubin 0.9 mg/dL      eGFR Non African Amer 90 mL/min/1.73      Globulin 2.9 gm/dL      A/G Ratio 1.5 g/dL      BUN/Creatinine Ratio 13.7     Anion Gap 11.0 mmol/L     Lipase [[id number]]  (Normal) Collected:  08/03/18 0128    Specimen:  Blood Updated:  08/03/18 0153     Lipase 47 U/L     Lactic Acid, Plasma [[id number]]  (Normal) Collected:  08/03/18 0128    Specimen:  Blood Updated:  08/03/18 0151     Lactate 0.9 mmol/L     CBC Auto Differential [[id number]]  (Abnormal) Collected:  08/03/18 0128    Specimen:  Blood Updated:  08/03/18 0137     WBC 7.30 10*3/mm3      RBC 5.45 10*6/mm3      Hemoglobin 15.5 g/dL      Hematocrit 45.2 %      MCV 82.9 fL      MCH 28.4 pg      MCHC 34.3 g/dL      RDW 12.9 %      RDW-SD 38.4 (L) fl      MPV 11.7 fL      Platelets 213 10*3/mm3      Neutrophil % 68.6 %      Lymphocyte % 26.0 %      Monocyte % 4.2 %      Eosinophil % 0.4 %      Basophil % 0.4 %      Immature Grans % 0.4 %      Neutrophils, Absolute 5.00 10*3/mm3      Lymphocytes, Absolute 1.90 10*3/mm3      Monocytes, Absolute 0.31 10*3/mm3      Eosinophils, Absolute 0.03 10*3/mm3      Basophils, Absolute 0.03 10*3/mm3      Immature Grans, Absolute 0.03 10*3/mm3      nRBC 0.0 /100 WBC     Urinalysis With Microscopic If Indicated (No Culture) - Urine, Clean Catch [[id number]]  (Abnormal) Collected:  08/03/18 0209    Specimen:  Urine from Urine, Clean Catch Updated:  08/03/18 0217     Color, UA Yellow     Appearance, UA Clear     pH, UA 6.5     Specific Gravity, UA 1.013     Glucose, UA Negative     Ketones, UA 15 mg/dL (1+) (A)     Bilirubin, UA Negative     Blood, UA Negative     Protein,  UA Negative     Leuk Esterase, UA Negative     Nitrite, UA Negative     Urobilinogen, UA 0.2 E.U./dL    Narrative:       Urine microscopic not indicated.                    MDM  Number of Diagnoses or Management Options  Myalgia: new and requires workup  Nausea and vomiting, intractability of vomiting not specified, unspecified vomiting type: new and requires workup  Nonintractable headache, unspecified chronicity pattern, unspecified headache type: new and requires workup     Amount and/or Complexity of Data Reviewed  Clinical lab tests: ordered and reviewed  Discuss the patient with other providers: yes    Patient Progress  Patient progress: stable        Final diagnoses:   Nonintractable headache, unspecified chronicity pattern, unspecified headache type   Nausea and vomiting, intractability of vomiting not specified, unspecified vomiting type   Myalgia            Ruth Sagastume, APRN  08/03/18 0349

## 2018-08-03 NOTE — DISCHARGE INSTRUCTIONS
Please follow up with your PCP in 1-2 days for a recheck  Return to the ER as needed    General Headache Without Cause  A headache is pain or discomfort felt around the head or neck area. The specific cause of a headache may not be found. There are many causes and types of headaches. A few common ones are:  · Tension headaches.  · Migraine headaches.  · Cluster headaches.  · Chronic daily headaches.    Follow these instructions at home:  Watch your condition for any changes. Take these steps to help with your condition:  Managing pain  · Take over-the-counter and prescription medicines only as told by your health care provider.  · Lie down in a dark, quiet room when you have a headache.  · If directed, apply ice to the head and neck area:  ? Put ice in a plastic bag.  ? Place a towel between your skin and the bag.  ? Leave the ice on for 20 minutes, 2-3 times per day.  · Use a heating pad or hot shower to apply heat to the head and neck area as told by your health care provider.  · Keep lights dim if bright lights bother you or make your headaches worse.  Eating and drinking  · Eat meals on a regular schedule.  · Limit alcohol use.  · Decrease the amount of caffeine you drink, or stop drinking caffeine.  General instructions  · Keep all follow-up visits as told by your health care provider. This is important.  · Keep a headache journal to help find out what may trigger your headaches. For example, write down:  ? What you eat and drink.  ? How much sleep you get.  ? Any change to your diet or medicines.  · Try massage or other relaxation techniques.  · Limit stress.  · Sit up straight, and do not tense your muscles.  · Do not use tobacco products, including cigarettes, chewing tobacco, or e-cigarettes. If you need help quitting, ask your health care provider.  · Exercise regularly as told by your health care provider.  · Sleep on a regular schedule. Get 7-9 hours of sleep, or the amount recommended by your health  care provider.  Contact a health care provider if:  · Your symptoms are not helped by medicine.  · You have a headache that is different from the usual headache.  · You have nausea or you vomit.  · You have a fever.  Get help right away if:  · Your headache becomes severe.  · You have repeated vomiting.  · You have a stiff neck.  · You have a loss of vision.  · You have problems with speech.  · You have pain in the eye or ear.  · You have muscular weakness or loss of muscle control.  · You lose your balance or have trouble walking.  · You feel faint or pass out.  · You have confusion.  This information is not intended to replace advice given to you by your health care provider. Make sure you discuss any questions you have with your health care provider.  Document Released: 12/18/2006 Document Revised: 05/25/2017 Document Reviewed: 04/11/2016  [employer] Interactive Patient Education © 2018 [employer]      Musculoskeletal Pain  Musculoskeletal pain is muscle and bone aches and pains. This pain can occur in any part of the body.  Follow these instructions at home:  · Only take medicines for pain, discomfort, or fever as told by your health care provider.  · You may continue all activities unless the activities cause more pain. When the pain lessens, slowly resume normal activities. Gradually increase the intensity and duration of the activities or exercise.  · During periods of severe pain, bed rest may be helpful. Lie or sit in any position that is comfortable, but get out of bed and walk around at least every several hours.  · If directed, put ice on the injured area.  ? Put ice in a plastic bag.  ? Place a towel between your skin and the bag.  ? Leave the ice on for 20 minutes, 2-3 times a day.  Contact a health care provider if:  · Your pain is getting worse.  · Your pain is not relieved with medicines.  · You lose function in the area of the pain if the pain is in your arms, legs, or neck.  This information is  not intended to replace advice given to you by your health care provider. Make sure you discuss any questions you have with your health care provider.  Document Released: 12/18/2006 Document Revised: 05/30/2017 Document Reviewed: 08/22/2014  [employer] Interactive Patient Education © 2017 [employer]

## 2018-08-07 ENCOUNTER — OFFICE VISIT (OUTPATIENT)
Dept: UROLOGY | Facility: CLINIC | Age: 38
End: 2018-08-07

## 2018-08-07 VITALS
DIASTOLIC BLOOD PRESSURE: 70 MMHG | BODY MASS INDEX: 28 KG/M2 | HEIGHT: 71 IN | SYSTOLIC BLOOD PRESSURE: 120 MMHG | WEIGHT: 200 LBS

## 2018-08-07 DIAGNOSIS — N43.40 SPERMATOCELE: Primary | ICD-10-CM

## 2018-08-07 DIAGNOSIS — N50.819 TESTALGIA: ICD-10-CM

## 2018-08-07 LAB
BILIRUB BLD-MCNC: NEGATIVE MG/DL
CLARITY, POC: CLEAR
COLOR UR: YELLOW
GLUCOSE UR STRIP-MCNC: NEGATIVE MG/DL
KETONES UR QL: NEGATIVE
LEUKOCYTE EST, POC: NEGATIVE
NITRITE UR-MCNC: NEGATIVE MG/ML
PH UR: 6.5 [PH] (ref 5–8)
PROT UR STRIP-MCNC: NEGATIVE MG/DL
RBC # UR STRIP: NEGATIVE /UL
SP GR UR: 1.03 (ref 1–1.03)
UROBILINOGEN UR QL: NORMAL

## 2018-08-07 PROCEDURE — 99213 OFFICE O/P EST LOW 20 MIN: CPT | Performed by: UROLOGY

## 2018-08-07 NOTE — PROGRESS NOTES
Subjective    Mr. Garcia is 38 y.o. male    CHIEF COMPLAINT: Scrotal swelling    The patient comes back today with ultrasound scrotum which I personally reviewed this shows a multiloculated right epididymal spermatocele to testes themselves are perfectly normal on both sides    Scrotal ultrasound independent review    The scrotal ultrasound is available for me to review.  Treatment recommendations require an independent review.  This film has been reviewed by the radiologist to determine any non urologic abnormalities that are presents. Results are as follows:    3 cm    Testis:  Normal in size and echotexture, without focal lesion    Epididymis:  Multiple spermatoceles measuring 3 cm    Hydrocele:  No evidence of hydrocele    Varicocele:  No evidence of varicocele      Left    Testis:  Normal in size and echotexture, without focal lesion    Epididymis:  Normal in size and echotexture, without focal lesion    Hydrocele:  No evidence of hydrocele    Varicocele:  No evidence of varicocele          History of Present Illness      The following portions of the patient's history were reviewed and updated as appropriate: allergies, current medications, past family history, past medical history, past social history, past surgical history and problem list.    Review of Systems   Constitutional: Negative for chills and fever.   Gastrointestinal: Negative for abdominal pain, anal bleeding and blood in stool.   Genitourinary: Positive for scrotal swelling and testicular pain. Negative for decreased urine volume, difficulty urinating, discharge, dysuria, enuresis, flank pain, frequency, genital sores, hematuria, penile pain, penile swelling and urgency.       No current outpatient prescriptions on file.    Past Medical History:   Diagnosis Date   • Cervical herniated disc    • Lumbar herniated disc    • Lyme disease    • [place] spotted fever    • [place] spotted fever    • Varicocele        Past Surgical  "History:   Procedure Laterality Date   • ANKLE SURGERY     • FEMUR SURGERY     • MANDIBLE FRACTURE SURGERY         Social History     Social History   • Marital status:      Social History Main Topics   • Smoking status: Former Smoker   • Smokeless tobacco: Current User     Types: Chew   • Alcohol use No   • Drug use: No   • Sexual activity: Defer     Other Topics Concern   • Not on file       Family History   Problem Relation Age of Onset   • Heart disease Paternal Grandmother    • Cancer Neg Hx        Objective    /70   Ht 180.3 cm (71\")   Wt 90.7 kg (200 lb)   BMI 27.89 kg/m²     Physical Exam      Admission on 08/03/2018, Discharged on 08/03/2018   Component Date Value Ref Range Status   • Glucose 08/03/2018 107* 70 - 100 mg/dL Final   • BUN 08/03/2018 13  5 - 21 mg/dL Final   • Creatinine 08/03/2018 0.95  0.50 - 1.40 mg/dL Final   • Sodium 08/03/2018 143  135 - 145 mmol/L Final   • Potassium 08/03/2018 4.2  3.5 - 5.3 mmol/L Final   • Chloride 08/03/2018 103  98 - 110 mmol/L Final   • CO2 08/03/2018 29.0  24.0 - 31.0 mmol/L Final   • Calcium 08/03/2018 9.6  8.4 - 10.4 mg/dL Final   • Total Protein 08/03/2018 7.2  6.3 - 8.7 g/dL Final   • Albumin 08/03/2018 4.30  3.50 - 5.00 g/dL Final   • ALT (SGPT) 08/03/2018 36  0 - 54 U/L Final   • AST (SGOT) 08/03/2018 24  7 - 45 U/L Final   • Alkaline Phosphatase 08/03/2018 61  24 - 120 U/L Final   • Total Bilirubin 08/03/2018 0.9  0.1 - 1.0 mg/dL Final   • eGFR Non African Amer 08/03/2018 90  >60 mL/min/1.73 Final   • Globulin 08/03/2018 2.9  gm/dL Final   • A/G Ratio 08/03/2018 1.5  1.1 - 2.5 g/dL Final   • BUN/Creatinine Ratio 08/03/2018 13.7  7.0 - 25.0 Final   • Anion Gap 08/03/2018 11.0  4.0 - 13.0 mmol/L Final   • Lipase 08/03/2018 47  23 - 203 U/L Final   • Color,  08/03/2018 Yellow  Yellow, Straw Final   • Appearance,  08/03/2018 Clear  Clear Final   • pH,  08/03/2018 6.5  5.0 - 8.0 Final   • Specific Gravity,  08/03/2018 1.013  1.005 - " 1.030 Final   • Glucose, UA 08/03/2018 Negative  Negative Final   • Ketones, UA 08/03/2018 15 mg/dL (1+)* Negative Final   • Bilirubin, UA 08/03/2018 Negative  Negative Final   • Blood, UA 08/03/2018 Negative  Negative Final   • Protein, UA 08/03/2018 Negative  Negative Final   • Leuk Esterase, UA 08/03/2018 Negative  Negative Final   • Nitrite, UA 08/03/2018 Negative  Negative Final   • Urobilinogen, UA 08/03/2018 0.2 E.U./dL  0.2 - 1.0 E.U./dL Final   • Lactate 08/03/2018 0.9  0.5 - 2.0 mmol/L Final   • WBC 08/03/2018 7.30  4.80 - 10.80 10*3/mm3 Final   • RBC 08/03/2018 5.45  4.80 - 5.90 10*6/mm3 Final   • Hemoglobin 08/03/2018 15.5  14.0 - 18.0 g/dL Final   • Hematocrit 08/03/2018 45.2  40.0 - 52.0 % Final   • MCV 08/03/2018 82.9  82.0 - 95.0 fL Final   • MCH 08/03/2018 28.4  28.0 - 32.0 pg Final   • MCHC 08/03/2018 34.3  33.0 - 36.0 g/dL Final   • RDW 08/03/2018 12.9  12.0 - 15.0 % Final   • RDW-SD 08/03/2018 38.4* 40.0 - 54.0 fl Final   • MPV 08/03/2018 11.7  6.0 - 12.0 fL Final   • Platelets 08/03/2018 213  130 - 400 10*3/mm3 Final   • Neutrophil % 08/03/2018 68.6  39.0 - 78.0 % Final   • Lymphocyte % 08/03/2018 26.0  15.0 - 45.0 % Final   • Monocyte % 08/03/2018 4.2  4.0 - 12.0 % Final   • Eosinophil % 08/03/2018 0.4  0.0 - 4.0 % Final   • Basophil % 08/03/2018 0.4  0.0 - 2.0 % Final   • Immature Grans % 08/03/2018 0.4  0.0 - 5.0 % Final   • Neutrophils, Absolute 08/03/2018 5.00  1.87 - 8.40 10*3/mm3 Final   • Lymphocytes, Absolute 08/03/2018 1.90  0.72 - 4.86 10*3/mm3 Final   • Monocytes, Absolute 08/03/2018 0.31  0.19 - 1.30 10*3/mm3 Final   • Eosinophils, Absolute 08/03/2018 0.03  0.00 - 0.70 10*3/mm3 Final   • Basophils, Absolute 08/03/2018 0.03  0.00 - 0.20 10*3/mm3 Final   • Immature Grans, Absolute 08/03/2018 0.03  0.00 - 0.03 10*3/mm3 Final   • nRBC 08/03/2018 0.0  0.0 - 0.0 /100 WBC Final       Results for orders placed or performed in visit on 08/07/18   POC Urinalysis Dipstick, Multipro   Result  Value Ref Range    Color Yellow Yellow, Straw, Dark Yellow, Elinor    Clarity, UA Clear Clear    Glucose, UA Negative Negative, 1000 mg/dL (3+) mg/dL    Bilirubin Negative Negative    Ketones, UA Negative Negative    Specific Gravity  1.030 1.005 - 1.030    Blood, UA Negative Negative    pH, Urine 6.5 5.0 - 8.0    Protein, POC Negative Negative mg/dL    Urobilinogen, UA Normal Normal    Nitrite, UA Negative Negative    Leukocytes Negative Negative       Assessment and Plan    Diagnoses and all orders for this visit:    Spermatocele  -     POC Urinalysis Dipstick, Multipro    Testalgia  -     POC Urinalysis Dipstick, Multipro    Plan--I discussed the options with the patient including continued observation self-examinations etc. versus a surgical removal I did indicate to them that would make him sterile most likely on that side since we did remove the epididymis along with this.    After thorough discussed the options he elected just observe things at this point in time I did recommend self exams monthly in the shower if he should notice any difference of the testicle he will let me know otherwise we will checking back again in a year he will call sooner of course as needed

## 2018-11-26 ENCOUNTER — OFFICE VISIT (OUTPATIENT)
Dept: OTOLARYNGOLOGY | Facility: CLINIC | Age: 38
End: 2018-11-26

## 2018-11-26 VITALS
TEMPERATURE: 98.3 F | BODY MASS INDEX: 30.17 KG/M2 | HEART RATE: 68 BPM | DIASTOLIC BLOOD PRESSURE: 84 MMHG | SYSTOLIC BLOOD PRESSURE: 148 MMHG | WEIGHT: 215.5 LBS | HEIGHT: 71 IN

## 2018-11-26 DIAGNOSIS — K14.0 GLOSSITIS: ICD-10-CM

## 2018-11-26 DIAGNOSIS — F17.221 CHEWING TOBACCO NICOTINE DEPENDENCE IN REMISSION: Primary | ICD-10-CM

## 2018-11-26 PROCEDURE — 99203 OFFICE O/P NEW LOW 30 MIN: CPT | Performed by: NURSE PRACTITIONER

## 2018-11-26 RX ORDER — IMIQUIMOD 12.5 MG/.25G
CREAM TOPICAL
Refills: 2 | COMMUNITY
Start: 2018-11-08 | End: 2019-10-17

## 2018-11-26 RX ORDER — DOXYCYCLINE 100 MG/1
100 CAPSULE ORAL 2 TIMES DAILY
Refills: 0 | COMMUNITY
Start: 2018-11-08 | End: 2019-10-17

## 2018-11-26 RX ORDER — FLUTICASONE PROPIONATE 50 MCG
SPRAY, SUSPENSION (ML) NASAL
COMMUNITY
Start: 2014-12-04 | End: 2019-02-22 | Stop reason: HOSPADM

## 2018-11-26 RX ORDER — TRIAMCINOLONE ACETONIDE 0.1 %
PASTE (GRAM) DENTAL
Qty: 5 G | Refills: 3 | Status: SHIPPED | OUTPATIENT
Start: 2018-11-26

## 2018-11-26 NOTE — PROGRESS NOTES
YOB: 1980  Location: [facility]  Location Address: [address] [place] [address], [facility], [address] [place] [address]  Location Phone: [phone]    Chief Complaint   Patient presents with   • Mouth Lesions       History of Present Illness  Luis Garcia Jr. is a 38 y.o. male.  Luis Garcia Jr. is here for evaluation of ENT complaints. The patient has had problems with tongue complaints  The symptoms are not localized to a particular location. The patient has had moderate symptoms. The symptoms have been present for the last several weeks The symptoms are aggravated by  no identifiable factors. The symptoms are improved by stopping chewing tobacco..  He stopped dipping 18 days ago.  He reports an irritation to the distal portion of his tongue.       Past Medical History:   Diagnosis Date   • Cervical herniated disc    • Leukoplakia of tongue    • Lumbar herniated disc    • Lyme disease    • [place] spotted fever    • Varicocele        Past Surgical History:   Procedure Laterality Date   • ANKLE SURGERY     • FEMUR SURGERY     • MANDIBLE FRACTURE SURGERY         Outpatient Medications Marked as Taking for the 18 encounter (Office Visit) with Jackeline Mcdonnell APRN   Medication Sig Dispense Refill   • CHANTIX STARTING MONTH NIKO 0.5 MG X 11 & 1 MG X 42 tablet See Admin Instructions.  0   • doxycycline (MONODOX) 100 MG capsule Take 100 mg by mouth 2 (Two) Times a Day.  0   • fluticasone (FLONASE) 50 MCG/ACT nasal spray into the nostril(s) as directed by provider.     • imiquimod (ALDARA) 5 % cream APPLY 1 APPLICATION TO AFFECTED AREA AT BEDTIME THREE TIMES A WEEK EXTERNALLY  2       Topamax [topiramate] and Toradol [ketorolac tromethamine]    Family History   Problem Relation Age of Onset   • Heart disease Paternal Grandmother    • Cancer Neg Hx        Social History     Socioeconomic History   • Marital status:      Spouse name: Not on file   • Number of children: Not on file   • Years of  education: Not on file   • Highest education level: Not on file   Social Needs   • Financial resource strain: Not on file   • Food insecurity - worry: Not on file   • Food insecurity - inability: Not on file   • Transportation needs - medical: Not on file   • Transportation needs - non-medical: Not on file   Occupational History   • Not on file   Tobacco Use   • Smoking status: Former Smoker   • Smokeless tobacco: Former User     Types: Chew     Quit date: 11/1/2018   Substance and Sexual Activity   • Alcohol use: No   • Drug use: No   • Sexual activity: Defer     Partners: Female   Other Topics Concern   • Not on file   Social History Narrative   • Not on file       Review of Systems   Constitutional: Negative.    HENT:        SEE HPI   Eyes: Negative.    Respiratory: Negative.    Cardiovascular: Negative.    Gastrointestinal: Negative.    Endocrine: Negative.    Genitourinary: Negative.    Musculoskeletal: Negative.    Skin: Negative.    Allergic/Immunologic: Negative.    Neurological: Negative.    Hematological: Negative.    Psychiatric/Behavioral: Negative.        Vitals:    11/26/18 1437   BP: 148/84   Pulse: 68   Temp: 98.3 °F (36.8 °C)       Body mass index is 30.06 kg/m².    Objective     Physical Exam  CONSTITUTIONAL: well nourished, alert, oriented, in no acute distress     COMMUNICATION AND VOICE: able to communicate normally, normal voice quality    HEAD: normocephalic, no lesions, atraumatic, no tenderness, no masses     FACE: appearance normal, no lesions, no tenderness, no deformities, facial motion symmetric    SALIVARY GLANDS: parotid glands with no tenderness, no swelling, no masses, submandibular glands with normal size, nontender    EYES: ocular motility normal, eyelids normal, orbits normal, no proptosis, conjunctiva normal , pupils equal, round     EARS:  Hearing: response to conversational voice normal bilaterally   External Ears: auricles without lesions  Otoscopic: tympanic membrane  appearance normal, no lesions, no perforation, normal mobility, no fluid    NOSE:  External Nose: structure normal, no tenderness on palpation, no nasal discharge, no lesions, no evidence of trauma, nostrils patent   Intranasal Exam: nasal mucosa normal, vestibule within normal limits, inferior turbinate normal, nasal septum midline     ORAL:  Lips: upper and lower lips without lesion   Teeth: dentition within normal limits for age   Gums: gingivae healthy   Oral Mucosa: right buccal region with approx 1 mm ulceration  Floor of Mouth: Warthin’s duct patent, mucosa normal  Tongue: tip of tongue inflammation - no obvious mass or lesion  Palate: soft and hard palates with normal mucosa and structure  Oropharynx: oropharyngeal mucosa normal      NECK: neck appearance normal, no mass,  noted without erythema or tenderness    THYROID: no overt thyromegaly, no tenderness, nodules or mass present on palpation, position midline     LYMPH NODES: no lymphadenopathy    CHEST/RESPIRATORY: respiratory effort normal    CARDIOVASCULAR: extremities without cyanosis or edema      NEUROLOGIC/PSYCHIATRIC: oriented to time, place and person, mood normal, affect appropriate, CN II-XII intact grossly    Assessment/Plan   Luis was seen today for mouth lesions.    Diagnoses and all orders for this visit:    Chewing tobacco nicotine dependence in remission    Glossitis    Other orders  -     triamcinolone (KENALOG) 0.1 % paste; Apply to tongue 1-2 times daily      * Surgery not found *  No orders of the defined types were placed in this encounter.    Return in about 3 months (around 2/26/2019).       Patient Instructions   No obvious lesion or mass    Call for problems or worsening symptoms

## 2019-02-22 ENCOUNTER — APPOINTMENT (OUTPATIENT)
Dept: GENERAL RADIOLOGY | Facility: HOSPITAL | Age: 39
End: 2019-02-22

## 2019-02-22 ENCOUNTER — HOSPITAL ENCOUNTER (EMERGENCY)
Facility: HOSPITAL | Age: 39
Discharge: HOME | End: 2019-02-22
Admitting: EMERGENCY MEDICINE

## 2019-02-22 VITALS
HEART RATE: 75 BPM | WEIGHT: 225 LBS | RESPIRATION RATE: 16 BRPM | BODY MASS INDEX: 30.48 KG/M2 | TEMPERATURE: 98 F | SYSTOLIC BLOOD PRESSURE: 153 MMHG | OXYGEN SATURATION: 97 % | HEIGHT: 72 IN | DIASTOLIC BLOOD PRESSURE: 90 MMHG

## 2019-02-22 DIAGNOSIS — J40 BRONCHITIS: Primary | ICD-10-CM

## 2019-02-22 DIAGNOSIS — J30.9 ALLERGIC RHINITIS, UNSPECIFIED SEASONALITY, UNSPECIFIED TRIGGER: ICD-10-CM

## 2019-02-22 DIAGNOSIS — R11.2 NAUSEA AND VOMITING, INTRACTABILITY OF VOMITING NOT SPECIFIED, UNSPECIFIED VOMITING TYPE: ICD-10-CM

## 2019-02-22 DIAGNOSIS — J01.10 ACUTE FRONTAL SINUSITIS, RECURRENCE NOT SPECIFIED: ICD-10-CM

## 2019-02-22 LAB
ALBUMIN SERPL-MCNC: 4.7 G/DL (ref 3.5–5)
ALBUMIN/GLOB SERPL: 1.5 G/DL (ref 1.1–2.5)
ALP SERPL-CCNC: 67 U/L (ref 24–120)
ALT SERPL W P-5'-P-CCNC: 36 U/L (ref 0–54)
AMYLASE SERPL-CCNC: 81 U/L (ref 30–110)
ANION GAP SERPL CALCULATED.3IONS-SCNC: 8 MMOL/L (ref 4–13)
AST SERPL-CCNC: 33 U/L (ref 7–45)
BASOPHILS # BLD AUTO: 0.05 10*3/MM3 (ref 0–0.2)
BASOPHILS NFR BLD AUTO: 0.4 % (ref 0–2)
BILIRUB SERPL-MCNC: 0.9 MG/DL (ref 0.1–1)
BILIRUB UR QL STRIP: NEGATIVE
BUN BLD-MCNC: 16 MG/DL (ref 5–21)
BUN/CREAT SERPL: 17.6 (ref 7–25)
CALCIUM SPEC-SCNC: 9.5 MG/DL (ref 8.4–10.4)
CHLORIDE SERPL-SCNC: 103 MMOL/L (ref 98–110)
CLARITY UR: CLEAR
CO2 SERPL-SCNC: 27 MMOL/L (ref 24–31)
COLOR UR: YELLOW
CREAT BLD-MCNC: 0.91 MG/DL (ref 0.5–1.4)
DEPRECATED RDW RBC AUTO: 41.3 FL (ref 40–54)
EOSINOPHIL # BLD AUTO: 0.09 10*3/MM3 (ref 0–0.7)
EOSINOPHIL NFR BLD AUTO: 0.8 % (ref 0–4)
ERYTHROCYTE [DISTWIDTH] IN BLOOD BY AUTOMATED COUNT: 13.3 % (ref 12–15)
FLUAV AG NPH QL: NEGATIVE
FLUBV AG NPH QL IA: NEGATIVE
GFR SERPL CREATININE-BSD FRML MDRD: 94 ML/MIN/1.73
GLOBULIN UR ELPH-MCNC: 3.2 GM/DL
GLUCOSE BLD-MCNC: 108 MG/DL (ref 70–100)
GLUCOSE UR STRIP-MCNC: NEGATIVE MG/DL
HCT VFR BLD AUTO: 53.3 % (ref 40–52)
HGB BLD-MCNC: 17.9 G/DL (ref 14–18)
HGB UR QL STRIP.AUTO: NEGATIVE
HOLD SPECIMEN: NORMAL
HOLD SPECIMEN: NORMAL
IMM GRANULOCYTES # BLD AUTO: 0.04 10*3/MM3 (ref 0–0.05)
IMM GRANULOCYTES NFR BLD AUTO: 0.3 % (ref 0–5)
KETONES UR QL STRIP: NEGATIVE
LEUKOCYTE ESTERASE UR QL STRIP.AUTO: NEGATIVE
LIPASE SERPL-CCNC: 91 U/L (ref 23–203)
LYMPHOCYTES # BLD AUTO: 2.07 10*3/MM3 (ref 0.72–4.86)
LYMPHOCYTES NFR BLD AUTO: 17.8 % (ref 15–45)
MCH RBC QN AUTO: 29.1 PG (ref 28–32)
MCHC RBC AUTO-ENTMCNC: 33.6 G/DL (ref 33–36)
MCV RBC AUTO: 86.7 FL (ref 82–95)
MONOCYTES # BLD AUTO: 0.52 10*3/MM3 (ref 0.19–1.3)
MONOCYTES NFR BLD AUTO: 4.5 % (ref 4–12)
NEUTROPHILS # BLD AUTO: 8.87 10*3/MM3 (ref 1.87–8.4)
NEUTROPHILS NFR BLD AUTO: 76.2 % (ref 39–78)
NITRITE UR QL STRIP: NEGATIVE
NRBC BLD AUTO-RTO: 0 /100 WBC (ref 0–0)
PH UR STRIP.AUTO: 5.5 [PH] (ref 5–8)
PLATELET # BLD AUTO: 327 10*3/MM3 (ref 130–400)
PMV BLD AUTO: 11.4 FL (ref 6–12)
POTASSIUM BLD-SCNC: 4.2 MMOL/L (ref 3.5–5.3)
PROT SERPL-MCNC: 7.9 G/DL (ref 6.3–8.7)
PROT UR QL STRIP: NEGATIVE
RBC # BLD AUTO: 6.15 10*6/MM3 (ref 4.8–5.9)
S PYO AG THROAT QL: NEGATIVE
SODIUM BLD-SCNC: 138 MMOL/L (ref 135–145)
SP GR UR STRIP: 1.02 (ref 1–1.03)
UROBILINOGEN UR QL STRIP: NORMAL
WBC NRBC COR # BLD: 11.64 10*3/MM3 (ref 4.8–10.8)
WHOLE BLOOD HOLD SPECIMEN: NORMAL
WHOLE BLOOD HOLD SPECIMEN: NORMAL

## 2019-02-22 PROCEDURE — 85025 COMPLETE CBC W/AUTO DIFF WBC: CPT | Performed by: NURSE PRACTITIONER

## 2019-02-22 PROCEDURE — 99284 EMERGENCY DEPT VISIT MOD MDM: CPT

## 2019-02-22 PROCEDURE — 96361 HYDRATE IV INFUSION ADD-ON: CPT

## 2019-02-22 PROCEDURE — 83690 ASSAY OF LIPASE: CPT | Performed by: NURSE PRACTITIONER

## 2019-02-22 PROCEDURE — 87081 CULTURE SCREEN ONLY: CPT | Performed by: NURSE PRACTITIONER

## 2019-02-22 PROCEDURE — 80053 COMPREHEN METABOLIC PANEL: CPT | Performed by: NURSE PRACTITIONER

## 2019-02-22 PROCEDURE — 71046 X-RAY EXAM CHEST 2 VIEWS: CPT

## 2019-02-22 PROCEDURE — 25010000002 ONDANSETRON PER 1 MG: Performed by: NURSE PRACTITIONER

## 2019-02-22 PROCEDURE — 96375 TX/PRO/DX INJ NEW DRUG ADDON: CPT

## 2019-02-22 PROCEDURE — 87040 BLOOD CULTURE FOR BACTERIA: CPT | Performed by: NURSE PRACTITIONER

## 2019-02-22 PROCEDURE — 82150 ASSAY OF AMYLASE: CPT | Performed by: NURSE PRACTITIONER

## 2019-02-22 PROCEDURE — 25010000002 METHYLPREDNISOLONE PER 125 MG: Performed by: NURSE PRACTITIONER

## 2019-02-22 PROCEDURE — 87880 STREP A ASSAY W/OPTIC: CPT | Performed by: NURSE PRACTITIONER

## 2019-02-22 PROCEDURE — 25010000002 CEFTRIAXONE PER 250 MG: Performed by: NURSE PRACTITIONER

## 2019-02-22 PROCEDURE — 81003 URINALYSIS AUTO W/O SCOPE: CPT | Performed by: NURSE PRACTITIONER

## 2019-02-22 PROCEDURE — 87804 INFLUENZA ASSAY W/OPTIC: CPT | Performed by: NURSE PRACTITIONER

## 2019-02-22 PROCEDURE — 94640 AIRWAY INHALATION TREATMENT: CPT

## 2019-02-22 PROCEDURE — 87077 CULTURE AEROBIC IDENTIFY: CPT | Performed by: NURSE PRACTITIONER

## 2019-02-22 PROCEDURE — 94799 UNLISTED PULMONARY SVC/PX: CPT

## 2019-02-22 PROCEDURE — 96365 THER/PROPH/DIAG IV INF INIT: CPT

## 2019-02-22 RX ORDER — METHYLPREDNISOLONE SODIUM SUCCINATE 125 MG/2ML
125 INJECTION, POWDER, LYOPHILIZED, FOR SOLUTION INTRAMUSCULAR; INTRAVENOUS ONCE
Status: COMPLETED | OUTPATIENT
Start: 2019-02-22 | End: 2019-02-22

## 2019-02-22 RX ORDER — ONDANSETRON 2 MG/ML
4 INJECTION INTRAMUSCULAR; INTRAVENOUS ONCE
Status: COMPLETED | OUTPATIENT
Start: 2019-02-22 | End: 2019-02-22

## 2019-02-22 RX ORDER — ACETAMINOPHEN 500 MG
1000 TABLET ORAL ONCE
Status: COMPLETED | OUTPATIENT
Start: 2019-02-22 | End: 2019-02-22

## 2019-02-22 RX ORDER — IPRATROPIUM BROMIDE AND ALBUTEROL SULFATE 2.5; .5 MG/3ML; MG/3ML
3 SOLUTION RESPIRATORY (INHALATION) ONCE
Status: COMPLETED | OUTPATIENT
Start: 2019-02-22 | End: 2019-02-22

## 2019-02-22 RX ORDER — METHYLPREDNISOLONE 4 MG/1
TABLET ORAL
Qty: 21 TABLET | Refills: 0 | Status: SHIPPED | OUTPATIENT
Start: 2019-02-22 | End: 2019-10-17

## 2019-02-22 RX ORDER — DEXTROMETHORPHAN HYDROBROMIDE AND PROMETHAZINE HYDROCHLORIDE 15; 6.25 MG/5ML; MG/5ML
5 SYRUP ORAL 4 TIMES DAILY PRN
Qty: 180 ML | Refills: 0 | Status: SHIPPED | OUTPATIENT
Start: 2019-02-22 | End: 2019-10-17

## 2019-02-22 RX ORDER — ALBUTEROL SULFATE 90 UG/1
2 AEROSOL, METERED RESPIRATORY (INHALATION) EVERY 4 HOURS PRN
Qty: 18 G | Refills: 0 | Status: SHIPPED | OUTPATIENT
Start: 2019-02-22 | End: 2019-10-17

## 2019-02-22 RX ORDER — CEFDINIR 300 MG/1
300 CAPSULE ORAL 2 TIMES DAILY
Qty: 20 CAPSULE | Refills: 0 | Status: SHIPPED | OUTPATIENT
Start: 2019-02-22 | End: 2019-03-04

## 2019-02-22 RX ORDER — MEPERIDINE HYDROCHLORIDE 25 MG/ML
25 INJECTION INTRAMUSCULAR; INTRAVENOUS; SUBCUTANEOUS ONCE
Status: COMPLETED | OUTPATIENT
Start: 2019-02-22 | End: 2019-02-22

## 2019-02-22 RX ORDER — FLUTICASONE PROPIONATE 50 MCG
2 SPRAY, SUSPENSION (ML) NASAL DAILY
Qty: 16 G | Refills: 0 | Status: SHIPPED | OUTPATIENT
Start: 2019-02-22 | End: 2019-10-17 | Stop reason: SDUPTHER

## 2019-02-22 RX ORDER — FLUTICASONE PROPIONATE 50 MCG
2 SPRAY, SUSPENSION (ML) NASAL DAILY
Qty: 16 G | Refills: 0 | Status: SHIPPED | OUTPATIENT
Start: 2019-02-22 | End: 2019-10-17

## 2019-02-22 RX ADMIN — METHYLPREDNISOLONE SODIUM SUCCINATE 125 MG: 125 INJECTION, POWDER, FOR SOLUTION INTRAMUSCULAR; INTRAVENOUS at 14:45

## 2019-02-22 RX ADMIN — MEPERIDINE HYDROCHLORIDE 25 MG: 25 INJECTION INTRAMUSCULAR; INTRAVENOUS; SUBCUTANEOUS at 13:59

## 2019-02-22 RX ADMIN — SODIUM CHLORIDE 1000 ML: 9 INJECTION, SOLUTION INTRAVENOUS at 13:58

## 2019-02-22 RX ADMIN — IPRATROPIUM BROMIDE AND ALBUTEROL SULFATE 3 ML: 2.5; .5 SOLUTION RESPIRATORY (INHALATION) at 13:23

## 2019-02-22 RX ADMIN — CEFTRIAXONE SODIUM 1 G: 1 INJECTION, POWDER, FOR SOLUTION INTRAMUSCULAR; INTRAVENOUS at 14:45

## 2019-02-22 RX ADMIN — ONDANSETRON 4 MG: 2 SOLUTION INTRAMUSCULAR; INTRAVENOUS at 13:59

## 2019-02-22 RX ADMIN — ACETAMINOPHEN 1000 MG: 500 TABLET, FILM COATED ORAL at 14:28

## 2019-02-22 NOTE — ED PROVIDER NOTES
Subjective   Patient is a 39-year-old white male presents with generalized body aches, cough, sore throat, vomiting.  He states he has had a upper respiratory infection about a week ago and was getting better.  He was on amoxicillin.  He states last night his cough has been much worse.  this morning he started having fever, chills, and vomiting.  He denies any abdominal pain.  He states he is starting to feel very weak at this time.  He states his cough has been productive with yellowish sputum.        History provided by:  Patient   used: No        Review of Systems   Constitutional: Negative.    HENT:        Pt states that he has had sore throat for about 1 week. He has been on amoxil for the past 5 days and states that he is not getting better   Eyes: Negative.    Respiratory:        Patient is a 39-year-old white male presents with generalized body aches, cough, sore throat, vomiting.  He states he has had a upper respiratory infection about a week ago and was getting better.  He was on amoxicillin.  He states last night his cough has been much worse.  this morning he started having fever, chills, and vomiting.  He denies any abdominal pain.  He states he is starting to feel very weak at this time.  He states his cough has been productive with yellowish sputum. Pt states that he is having a lot of pain all over and is having frontal headache as well     Cardiovascular: Negative.    Gastrointestinal: Negative.    Endocrine: Negative.    Genitourinary: Negative.    Musculoskeletal: Negative.    Skin: Negative.    Allergic/Immunologic: Negative.    Neurological: Negative.    Hematological: Negative.    Psychiatric/Behavioral: Negative.    All other systems reviewed and are negative.      Past Medical History:   Diagnosis Date   • Cervical herniated disc    • Leukoplakia of tongue    • Lumbar herniated disc    • Lyme disease    • [place] spotted fever    • Varicocele        Allergies  "  Allergen Reactions   • Topamax [Topiramate] Unknown (See Comments)     Patient states he has blurry vision   • Toradol [Ketorolac Tromethamine] Palpitations       Past Surgical History:   Procedure Laterality Date   • ANKLE SURGERY     • FEMUR SURGERY     • MANDIBLE FRACTURE SURGERY         Family History   Problem Relation Age of Onset   • Heart disease Paternal Grandmother    • Cancer Neg Hx        Social History     Socioeconomic History   • Marital status:      Spouse name: Not on file   • Number of children: Not on file   • Years of education: Not on file   • Highest education level: Not on file   Tobacco Use   • Smoking status: Former Smoker   • Smokeless tobacco: Former User     Types: Chew     Quit date: 11/1/2018   Substance and Sexual Activity   • Alcohol use: No   • Drug use: No   • Sexual activity: Defer     Partners: Female       Prior to Admission medications    Medication Sig Start Date End Date Taking? Authorizing Provider   CHANTIX STARTING MONTH NIKO 0.5 MG X 11 & 1 MG X 42 tablet See Admin Instructions. 11/8/18   Arlene Garg MD   doxycycline (MONODOX) 100 MG capsule Take 100 mg by mouth 2 (Two) Times a Day. 11/8/18   Arlene Garg MD   fluticasone (FLONASE) 50 MCG/ACT nasal spray into the nostril(s) as directed by provider. 12/4/14   Arlene Garg MD   imiquimod (ALDARA) 5 % cream APPLY 1 APPLICATION TO AFFECTED AREA AT BEDTIME THREE TIMES A WEEK EXTERNALLY 11/8/18   Arlene Garg MD   triamcinolone (KENALOG) 0.1 % paste Apply to tongue 1-2 times daily 11/26/18   Jackeline Mcdonnell APRN       /90   Pulse 75   Temp 98 °F (36.7 °C) (Oral)   Resp 16   Ht 182.9 cm (72\")   Wt 102 kg (225 lb)   SpO2 97%   BMI 30.52 kg/m²     Objective   Physical Exam   Constitutional: He is oriented to person, place, and time. He appears well-developed and well-nourished.   HENT:   Head: Normocephalic and atraumatic.   O/p eryth with thick clear pnd. No exudate    Eyes: " Conjunctivae and EOM are normal. Pupils are equal, round, and reactive to light.   Neck: Normal range of motion. Neck supple.   Supple. No adenopathy. No signs of meningeal irritation    Cardiovascular: Normal rate, regular rhythm, normal heart sounds and intact distal pulses.   Pulmonary/Chest: Effort normal.   Exp wheezing throughout. Sl diminished lung sounds bilat bases   Abdominal: Soft. Bowel sounds are normal.   Musculoskeletal: Normal range of motion.   Neurological: He is alert and oriented to person, place, and time. He has normal reflexes.   Skin: Skin is warm and dry.   Sl pallor noted   Psychiatric: He has a normal mood and affect. His behavior is normal. Judgment and thought content normal.   Nursing note and vitals reviewed.      Procedures         Lab Results (last 24 hours)     Procedure Component Value Units Date/Time    CBC & Differential [[id number]] Collected:  02/22/19 1345    Specimen:  Blood Updated:  02/22/19 1452    Narrative:       The following orders were created for panel order CBC & Differential.  Procedure                               Abnormality         Status                     ---------                               -----------         ------                     CBC Auto Differential[[id number]]        Abnormal            Final result                 Please view results for these tests on the individual orders.    Comprehensive Metabolic Panel [[id number]]  (Abnormal) Collected:  02/22/19 1345    Specimen:  Blood Updated:  02/22/19 2242     Glucose 108 mg/dL      BUN 16 mg/dL      Creatinine 0.91 mg/dL      Sodium 138 mmol/L      Potassium 4.2 mmol/L      Chloride 103 mmol/L      CO2 27.0 mmol/L      Calcium 9.5 mg/dL      Total Protein 7.9 g/dL      Albumin 4.70 g/dL      ALT (SGPT) 36 U/L      AST (SGOT) 33 U/L      Alkaline Phosphatase 67 U/L      Total Bilirubin 0.9 mg/dL      eGFR Non African Amer 94 mL/min/1.73      Globulin 3.2 gm/dL      A/G Ratio 1.5 g/dL       BUN/Creatinine Ratio 17.6     Anion Gap 8.0 mmol/L     Lipase [[id number]]  (Normal) Collected:  02/22/19 1345    Specimen:  Blood Updated:  02/22/19 1512     Lipase 91 U/L     Amylase [[id number]]  (Normal) Collected:  02/22/19 1345    Specimen:  Blood Updated:  02/22/19 1512     Amylase 81 U/L     Blood Culture With WOJCIECH - Blood, Arm, Left [[id number]] Collected:  02/22/19 1345    Specimen:  Blood from Arm, Left Updated:  02/22/19 1514    CBC Auto Differential [[id number]]  (Abnormal) Collected:  02/22/19 1345    Specimen:  Blood Updated:  02/22/19 1458     WBC 11.64 10*3/mm3      RBC 6.15 10*6/mm3      Hemoglobin 17.9 g/dL      Hematocrit 53.3 %      MCV 86.7 fL      MCH 29.1 pg      MCHC 33.6 g/dL      RDW 13.3 %      RDW-SD 41.3 fl      MPV 11.4 fL      Platelets 327 10*3/mm3      Neutrophil % 76.2 %      Lymphocyte % 17.8 %      Monocyte % 4.5 %      Eosinophil % 0.8 %      Basophil % 0.4 %      Immature Grans % 0.3 %      Neutrophils, Absolute 8.87 10*3/mm3      Lymphocytes, Absolute 2.07 10*3/mm3      Monocytes, Absolute 0.52 10*3/mm3      Eosinophils, Absolute 0.09 10*3/mm3      Basophils, Absolute 0.05 10*3/mm3      Immature Grans, Absolute 0.04 10*3/mm3      nRBC 0.0 /100 WBC     Influenza Antigen, Rapid - Swab, Nasopharynx [[id number]]  (Normal) Collected:  02/22/19 1346    Specimen:  Swab from Nasopharynx Updated:  02/22/19 1512     Influenza A Ag, EIA Negative     Influenza B Ag, EIA Negative    Narrative:       Recommend confirmation of negative results by viral culture or molecular assay.    Rapid Strep A Screen - Swab, Throat [[id number]]  (Normal) Collected:  02/22/19 1346    Specimen:  Swab from Throat Updated:  02/22/19 1510     Strep A Ag Negative    Beta Strep Culture, Throat - Swab, Throat [[id number]] Collected:  02/22/19 1346    Specimen:  Swab from Throat Updated:  02/22/19 1510    Urinalysis With Culture If Indicated - Urine, Clean Catch [[id number]]  (Normal) Collected:  02/22/19 7329    Specimen:   Urine, Clean Catch Updated:  02/22/19 1427     Color, UA Yellow     Appearance, UA Clear     pH, UA 5.5     Specific Gravity, UA 1.020     Glucose, UA Negative     Ketones, UA Negative     Bilirubin, UA Negative     Blood, UA Negative     Protein, UA Negative     Leuk Esterase, UA Negative     Nitrite, UA Negative     Urobilinogen, UA 0.2 E.U./dL    Narrative:       Urine microscopic not indicated.          XR Chest 2 View   Final Result   Shallow inspiration with no evidence of pneumonia.   Bronchitis is likely.   This report was finalized on 02/22/2019 14:21 by Dr. Carter Michelle MD.          ED Course  ED Course as of Feb 22 1935 Fri Feb 22, 2019   1542 Reviewed results with patient patient family.  Patient states that he is feeling some better at this time.  He states he is having some frontal sinus tenderness on the left side.  He has had no further vomiting since has been in the emergency department.  Color is much improved.  Patient will be discharged home soon in stable condition to follow-up with his PCP on Monday.  Advised to return before symptoms worsen.  [CW]      ED Course User Index  [CW] Tamera Puentes APRN          [facility]  Number of Diagnoses or Management Options  Acute frontal sinusitis, recurrence not specified: minor  Allergic rhinitis, unspecified seasonality, unspecified trigger: minor  Bronchitis: minor  Nausea and vomiting, intractability of vomiting not specified, unspecified vomiting type: minor     Amount and/or Complexity of Data Reviewed  Clinical lab tests: ordered and reviewed  Tests in the radiology section of CPT®: ordered and reviewed    Patient Progress  Patient progress: stable      Final diagnoses:   Bronchitis   Acute frontal sinusitis, recurrence not specified   Allergic rhinitis, unspecified seasonality, unspecified trigger   Nausea and vomiting, intractability of vomiting not specified, unspecified vomiting type          Tamera Puentes APRN  02/22/19  1936

## 2019-02-22 NOTE — DISCHARGE INSTRUCTIONS
Return to ER if symptoms worsen   Increase oral fluids    Allergic Rhinitis, Adult  Allergic rhinitis is an allergic reaction that affects the mucous membrane inside the nose. It causes sneezing, a runny or stuffy nose, and the feeling of mucus going down the back of the throat (postnasal drip). Allergic rhinitis can be mild to severe.  There are two types of allergic rhinitis:  · Seasonal. This type is also called hay fever. It happens only during certain seasons.  · Perennial. This type can happen at any time of the year.    What are the causes?  This condition happens when the body's defense system (immune system) responds to certain harmless substances called allergens as though they were germs.   Seasonal allergic rhinitis is triggered by pollen, which can come from grasses, trees, and weeds. Perennial allergic rhinitis may be caused by:  · House dust mites.  · Pet dander.  · Mold spores.    What are the signs or symptoms?  Symptoms of this condition include:  · Sneezing.  · Runny or stuffy nose (nasal congestion).  · Postnasal drip.  · Itchy nose.  · Tearing of the eyes.  · Trouble sleeping.  · Daytime sleepiness.    How is this diagnosed?  This condition may be diagnosed based on:  · Your medical history.  · A physical exam.  · Tests to check for related conditions, such as:  ? Asthma.  ? Pink eye.  ? Ear infection.  ? Upper respiratory infection.  · Tests to find out which allergens trigger your symptoms. These may include skin or blood tests.    How is this treated?  There is no cure for this condition, but treatment can help control symptoms. Treatment may include:  · Taking medicines that block allergy symptoms, such as antihistamines. Medicine may be given as a shot, nasal spray, or pill.  · Avoiding the allergen.  · Desensitization. This treatment involves getting ongoing shots until your body becomes less sensitive to the allergen. This treatment may be done if other treatments do not help.  · If  taking medicine and avoiding the allergen does not work, new, stronger medicines may be prescribed.    Follow these instructions at home:  · Find out what you are allergic to. Common allergens include smoke, dust, and pollen.  · Avoid the things you are allergic to. These are some things you can do to help avoid allergens:  ? Replace carpet with wood, tile, or vinyl cady. Carpet can trap dander and dust.  ? Do not smoke. Do not allow smoking in your home.  ? Change your heating and air conditioning filter at least once a month.  ? During allergy season:  § Keep windows closed as much as possible.  § Plan outdoor activities when pollen counts are lowest. This is usually during the evening hours.  § When coming indoors, change clothing and shower before sitting on furniture or bedding.  · Take over-the-counter and prescription medicines only as told by your health care provider.  · Keep all follow-up visits as told by your health care provider. This is important.  Contact a health care provider if:  · You have a fever.  · You develop a persistent cough.  · You make whistling sounds when you breathe (you wheeze).  · Your symptoms interfere with your normal daily activities.  Get help right away if:  · You have shortness of breath.  Summary  · This condition can be managed by taking medicines as directed and avoiding allergens.  · Contact your health care provider if you develop a persistent cough or fever.  · During allergy season, keep windows closed as much as possible.  This information is not intended to replace advice given to you by your health care provider. Make sure you discuss any questions you have with your health care provider.  Document Released: 09/12/2002 Document Revised: 01/25/2018 Document Reviewed: 01/25/2018  Elsevier Interactive Patient Education © 2018 [employer]      Nasal Allergies  Nasal allergies are a reaction to allergens in the air. Allergens are particles in the air that cause your  body to have an allergic reaction. Nasal allergies are not passed from person to person (are not contagious). They cannot be cured, but they can be controlled.  What are the causes?  Seasonal nasal allergies (hay fever) are caused by pollen allergens that come from grasses, trees, and weeds. Year-round nasal allergies (perennial allergic rhinitis) are caused by allergens such as house dust mites, pet dander, and mold spores.  What increases the risk?  The following factors may make you more likely to develop this condition:  · Having certain health conditions. These include:  ? Other types of allergies, such as food allergies.  ? Asthma.  ? Eczema.  · Having a close relative who has allergies or asthma.  · Exposure to house dust, pollen, dander, or other allergens at home or at work.  · Exposure to air pollution or secondhand smoke when you were a child.    What are the signs or symptoms?  Symptoms of this condition include:  · Sneezing.  · Runny nose or stuffy nose (congestion).  · Watery (tearing) eyes.  · Itchy eyes, nose, mouth, throat, skin, or other area.  · Sore throat.  · Headache.  · Decreased sense of smell or taste.  · Fatigue. This may occur if you have trouble sleeping due to allergies.  · Swollen eyelids.    How is this diagnosed?  This condition is diagnosed with a medical history and physical exam. Allergy testing may be done to determine exactly what triggers your nasal allergies.  How is this treated?  There is no cure for nasal allergies. Treatment focuses on controlling your symptoms, and it may include:  · Medicines that block allergy symptoms. These may include allergy shots, nasal sprays, and oral antihistamines.  · Avoiding the allergen.    Follow these instructions at home:  · Avoid the allergen that is causing your symptoms, if possible.  · Keep windows closed. If possible, use air conditioning when pollen counts are high.  · Do not use fans in your home.  · Do not hang clothes outside to  dry.  · Wear sunglasses to keep pollen out of your eyes.  · Wash your hands right away after you touch household pets.  · Take over-the-counter and prescription medicines only as told by your health care provider.  · Keep all follow-up visits as told by your health care provider. This is important.  Contact a health care provider if:  · You have a fever.  · You develop a cough that does not go away (is persistent).  · You start to wheeze.  · Your symptoms do not improve with treatment.  · You have thick nasal discharge.  · You start to have nosebleeds.  Get help right away if:  · Your tongue or your lips are swollen.  · You have trouble breathing.  · You feel light-headed or you feel like you are going to faint.  · You have cold sweats.  This information is not intended to replace advice given to you by your health care provider. Make sure you discuss any questions you have with your health care provider.  Document Released: 12/18/2006 Document Revised: 05/22/2017 Document Reviewed: 06/29/2016  [employer] Interactive Patient Education © 2018 [employer]      Nausea, Adult  Nausea is the feeling of an upset stomach or having to vomit. Nausea on its own is not usually a serious concern, but it may be an early sign of a more serious medical problem. As nausea gets worse, it can lead to vomiting. If vomiting develops, or if you are not able to drink enough fluids, you are at risk of becoming dehydrated. Dehydration can make you tired and thirsty, cause you to have a dry mouth, and decrease how often you urinate. Older adults and people with other diseases or a weak immune system are at higher risk for dehydration. The main goals of treating your nausea are:  · To limit repeated nausea episodes.  · To prevent vomiting and dehydration.    Follow these instructions at home:  Follow instructions from your health care provider about how to care for yourself at home.  Eating and drinking  Follow these recommendations as told  by your health care provider:  · Take an oral rehydration solution (ORS). This is a drink that is sold at pharmacies and retail stores.  · Drink clear fluids in small amounts as you are able. Clear fluids include water, ice chips, diluted fruit juice, and low-calorie sports drinks.  · Eat bland, easy-to-digest foods in small amounts as you are able. These foods include bananas, applesauce, rice, lean meats, toast, and crackers.  · Avoid drinking fluids that contain a lot of sugar or caffeine, such as energy drinks, sports drinks, and soda.  · Avoid alcohol.  · Avoid spicy or fatty foods.    General instructions  · Drink enough fluid to keep your urine clear or pale yellow.  · Wash your hands often. If soap and water are not available, use hand .  · Make sure that all people in your household wash their hands well and often.  · Rest at home while you recover.  · Take over-the-counter and prescription medicines only as told by your health care provider.  · Breathe slowly and deeply when you feel nauseous.  · Watch your condition for any changes.  · Keep all follow-up visits as told by your health care provider. This is important.  Contact a health care provider if:  · You have a headache.  · You have new symptoms.  · Your nausea gets worse.  · You have a fever.  · You feel light-headed or dizzy.  · You vomit.  · You cannot keep fluids down.  Get help right away if:  · You have pain in your chest, neck, arm, or jaw.  · You feel extremely weak or you faint.  · You have vomit that is bright red or looks like coffee grounds.  · You have bloody or black stools or stools that look like tar.  · You have a severe headache, a stiff neck, or both.  · You have severe pain, cramping, or bloating in your abdomen.  · You have a rash.  · You have difficulty breathing or are breathing very quickly.  · Your heart is beating very quickly.  · Your skin feels cold and clammy.  · You feel confused.  · You have pain when you  urinate.  · You have signs of dehydration, such as:  ? Dark urine, very little, or no urine.  ? Cracked lips.  ? Dry mouth.  ? Sunken eyes.  ? Sleepiness.  ? Weakness.  These symptoms may represent a serious problem that is an emergency. Do not wait to see if the symptoms will go away. Get medical help right away. Call your local emergency services (911 in the [place] Do not drive yourself to the hospital.  This information is not intended to replace advice given to you by your health care provider. Make sure you discuss any questions you have with your health care provider.  Document Released: 01/25/2006 Document Revised: 05/22/2017 Document Reviewed: 08/23/2016  [employer] Interactive Patient Education © 2018 [employer] Inc.

## 2019-02-24 LAB — BACTERIA SPEC AEROBE CULT: NORMAL

## 2019-02-26 ENCOUNTER — OFFICE VISIT (OUTPATIENT)
Dept: OTOLARYNGOLOGY | Facility: CLINIC | Age: 39
End: 2019-02-26

## 2019-02-26 ENCOUNTER — APPOINTMENT (OUTPATIENT)
Dept: LAB | Facility: HOSPITAL | Age: 39
End: 2019-02-26

## 2019-02-26 ENCOUNTER — TRANSCRIBE ORDERS (OUTPATIENT)
Dept: OTOLARYNGOLOGY | Facility: CLINIC | Age: 39
End: 2019-02-26

## 2019-02-26 VITALS
TEMPERATURE: 98.8 F | SYSTOLIC BLOOD PRESSURE: 120 MMHG | HEART RATE: 80 BPM | HEIGHT: 72 IN | DIASTOLIC BLOOD PRESSURE: 80 MMHG | WEIGHT: 222 LBS | BODY MASS INDEX: 30.07 KG/M2

## 2019-02-26 DIAGNOSIS — Z91.018 ALLERGY, FOOD: Primary | ICD-10-CM

## 2019-02-26 DIAGNOSIS — F17.221 CHEWING TOBACCO NICOTINE DEPENDENCE IN REMISSION: ICD-10-CM

## 2019-02-26 DIAGNOSIS — Z91.018 FOOD ALLERGY: Primary | ICD-10-CM

## 2019-02-26 DIAGNOSIS — K14.0 GLOSSITIS: ICD-10-CM

## 2019-02-26 PROCEDURE — 86003 ALLG SPEC IGE CRUDE XTRC EA: CPT | Performed by: NURSE PRACTITIONER

## 2019-02-26 PROCEDURE — 36415 COLL VENOUS BLD VENIPUNCTURE: CPT | Performed by: NURSE PRACTITIONER

## 2019-02-26 PROCEDURE — 99213 OFFICE O/P EST LOW 20 MIN: CPT | Performed by: NURSE PRACTITIONER

## 2019-02-26 RX ORDER — LYSINE 500 MG
1 TABLET ORAL DAILY
Qty: 30 TABLET | Refills: 5 | Status: SHIPPED | OUTPATIENT
Start: 2019-02-26 | End: 2019-10-17

## 2019-02-26 NOTE — PROGRESS NOTES
YOB: 1980  Location: [facility]  Location Address: [address] [place] [address], [facility], [address] [place] [address]  Location Phone: [phone]    Chief Complaint   Patient presents with   • Follow-up       History of Present Illness  Luis Garcia Jr. is a 39 y.o. male.  Luis Garcia Jr. is here for follow up of ENT complaints. The patient has had problems with tongue sores and a coated tongue  The symptoms are not localized to a particular location. The patient has had moderate symptoms. The symptoms have been present for the last several months The symptoms are aggravated by  no identifiable factors. The symptoms are improved by no identifiable factors.  He reports every month or so many weeks he has a problems with tongue sores, coating that resolves after a few days.  He reports stopping chewing tobacco.       Past Medical History:   Diagnosis Date   • Cervical herniated disc    • Leukoplakia of tongue    • Lumbar herniated disc    • Lyme disease    • [place] spotted fever    • Varicocele        Past Surgical History:   Procedure Laterality Date   • ANKLE SURGERY     • FEMUR SURGERY     • MANDIBLE FRACTURE SURGERY         Outpatient Medications Marked as Taking for the 19 encounter (Office Visit) with Jackeline Mcdonnell APRN   Medication Sig Dispense Refill   • albuterol sulfate  (90 Base) MCG/ACT inhaler Inhale 2 puffs Every 4 (Four) Hours As Needed for Wheezing. 18 g 0   • cefdinir (OMNICEF) 300 MG capsule Take 1 capsule by mouth 2 (Two) Times a Day for 10 days. 20 capsule 0   • fluticasone (FLONASE) 50 MCG/ACT nasal spray 2 sprays into the nostril(s) as directed by provider Daily. 16 g 0   • imiquimod (ALDARA) 5 % cream APPLY 1 APPLICATION TO AFFECTED AREA AT BEDTIME THREE TIMES A WEEK EXTERNALLY  2   • promethazine-dextromethorphan (PROMETHAZINE-DM) 6.25-15 MG/5ML syrup Take 5 mL by mouth 4 (Four) Times a Day As Needed for Cough. 180 mL 0       Topamax [topiramate] and Toradol  [ketorolac tromethamine]    Family History   Problem Relation Age of Onset   • Heart disease Paternal Grandmother    • Cancer Neg Hx        Social History     Socioeconomic History   • Marital status:      Spouse name: Not on file   • Number of children: Not on file   • Years of education: Not on file   • Highest education level: Not on file   Social Needs   • Financial resource strain: Not on file   • Food insecurity - worry: Not on file   • Food insecurity - inability: Not on file   • Transportation needs - medical: Not on file   • Transportation needs - non-medical: Not on file   Occupational History   • Not on file   Tobacco Use   • Smoking status: Former Smoker   • Smokeless tobacco: Former User     Types: Chew     Quit date: 11/1/2018   Substance and Sexual Activity   • Alcohol use: No   • Drug use: No   • Sexual activity: Defer     Partners: Female   Other Topics Concern   • Not on file   Social History Narrative   • Not on file       Review of Systems   Constitutional: Negative.    HENT:        SEE HPI   Eyes: Negative.    Respiratory: Negative.    Cardiovascular: Negative.    Gastrointestinal: Negative.    Endocrine: Negative.    Genitourinary: Negative.    Musculoskeletal: Negative.    Skin: Negative.    Allergic/Immunologic: Negative.    Neurological: Negative.    Hematological: Negative.    Psychiatric/Behavioral: Negative.        Vitals:    02/26/19 1459   BP: 120/80   Pulse: 80   Temp: 98.8 °F (37.1 °C)       Body mass index is 30.1 kg/m².    Objective     Physical Exam  CONSTITUTIONAL: well nourished, alert, oriented, in no acute distress     COMMUNICATION AND VOICE: able to communicate normally, normal voice quality    HEAD: normocephalic, no lesions, atraumatic, no tenderness, no masses     FACE: appearance normal, no lesions, no tenderness, no deformities, facial motion symmetric    SALIVARY GLANDS: parotid glands with no tenderness, no swelling, no masses, submandibular glands with normal  size, nontender    EYES: ocular motility normal, eyelids normal, orbits normal, no proptosis, conjunctiva normal , pupils equal, round     EARS:  Hearing: response to conversational voice normal bilaterally   External Ears: auricles without lesions  Otoscopic: tympanic membrane appearance normal, no lesions, no perforation, normal mobility, no fluid    NOSE:  External Nose: structure normal, no tenderness on palpation, no nasal discharge, no lesions, no evidence of trauma, nostrils patent   Intranasal Exam: nasal mucosa normal, vestibule within normal limits, inferior turbinate normal, nasal septum midline     ORAL:  Lips: upper and lower lips without lesion   Teeth: dentition within normal limits for age   Gums: gingivae healthy   Oral Mucosa: oral mucosa normal, no mucosal lesions   Floor of Mouth: Warthin’s duct patent, mucosa normal  Tongue: lingual mucosa normal without lesions, normal tongue mobility   Palate: soft and hard palates with normal mucosa and structure  Oropharynx: oropharyngeal mucosa normal    NECK: neck appearance normal, no mass,  noted without erythema or tenderness    LYMPH NODES: no lymphadenopathy    CHEST/RESPIRATORY: respiratory effort normal    CARDIOVASCULAR:  extremities without cyanosis or edema      NEUROLOGIC/PSYCHIATRIC: oriented to time, place and person, mood normal, affect appropriate, CN II-XII intact grossly    Assessment/Plan   Luis was seen today for follow-up.    Diagnoses and all orders for this visit:    Food allergy  -     Immunocap Testing-food Panel    Glossitis    Chewing tobacco nicotine dependence in remission    Other orders  -     L-Lysine 500 MG tablet tablet; Take 1 tablet by mouth Daily.      * Surgery not found *  Orders Placed This Encounter   Procedures   • Immunocap Testing-food Panel     Return in about 3 months (around 5/26/2019).       Patient Instructions   Call for problems or worsening symptoms

## 2019-02-27 LAB — BACTERIA SPEC AEROBE CULT: NORMAL

## 2019-03-02 LAB
BAKER'S YEAST IGE QN: <0.1 KU/L
BARLEY IGE QN: <0.1 KU/L
CALIF WALNUT POLN IGE QN: <0.1 KU/L
CASEIN IGE QN: <0.1 KU/L
CHEESE MOLD IGE QN: <0.1 KU/L
CLAM IGE QN: 0.4 KU/L
CLAM IGE QN: 0.58 KU/L
COCOA IGE QN: <0.1 KU/L
CODFISH IGE QN: <0.1 KU/L
CONV CLASS DESCRIPTION: ABNORMAL
CONV CLASS DESCRIPTION: ABNORMAL
CORN IGE QN: <0.1 KU/L
COW MILK IGE QN: <0.1 KU/L
CRAB IGE QN: <0.1 KU/L
CRAB IGE QN: <0.1 KU/L
EGG WHITE IGE QN: <0.1 KU/L
GLUTEN IGE QN: <0.1 KU/L
LOBSTER IGE QN: 0.86 KU/L
OAT IGE QN: <0.1 KU/L
OYSTER IGE QN: <0.1 KU/L
PEA IGE QN: <0.1 KU/L
PEANUT IGE QN: <0.1 KU/L
PECAN/HICK NUT IGE QN: <0.1 KU/L
RYE IGE: <0.1 KU/L
SCALLOP IGE QN: 0.17 KU/L
SESAME SEED IGE: <0.1 KU/L
SHRIMP IGE: 0.76 KU/L
SHRIMP IGE: 1.19 KU/L
SOYBEAN IGE QN: <0.1 KU/L
WHEAT IGE QN: <0.1 KU/L

## 2019-03-03 LAB
BEEF IGE QN: <0.1 KU/L
CHICKEN MEAT IGE QN: <0.1 KU/L
LAMB IGE QN: <0.1 KU/L
PORK IGE QN: <0.1 KU/L
RABBIT MEAT IGE QN: <0.1 KU/L

## 2019-10-17 ENCOUNTER — OFFICE VISIT (OUTPATIENT)
Dept: FAMILY MEDICINE CLINIC | Facility: CLINIC | Age: 39
End: 2019-10-17

## 2019-10-17 ENCOUNTER — HOSPITAL ENCOUNTER (OUTPATIENT)
Dept: GENERAL RADIOLOGY | Facility: HOSPITAL | Age: 39
Discharge: HOME | End: 2019-10-17
Admitting: FAMILY MEDICINE

## 2019-10-17 VITALS
SYSTOLIC BLOOD PRESSURE: 124 MMHG | TEMPERATURE: 98.2 F | WEIGHT: 210.2 LBS | HEIGHT: 72 IN | BODY MASS INDEX: 28.47 KG/M2 | RESPIRATION RATE: 18 BRPM | HEART RATE: 64 BPM | DIASTOLIC BLOOD PRESSURE: 90 MMHG | OXYGEN SATURATION: 98 %

## 2019-10-17 DIAGNOSIS — M54.6 CHRONIC MIDLINE THORACIC BACK PAIN: ICD-10-CM

## 2019-10-17 DIAGNOSIS — Z13.1 DIABETES MELLITUS SCREENING: ICD-10-CM

## 2019-10-17 DIAGNOSIS — G89.29 CHRONIC MIDLINE THORACIC BACK PAIN: ICD-10-CM

## 2019-10-17 DIAGNOSIS — R53.82 CHRONIC FATIGUE: Primary | ICD-10-CM

## 2019-10-17 DIAGNOSIS — E66.3 OVERWEIGHT (BMI 25.0-29.9): ICD-10-CM

## 2019-10-17 DIAGNOSIS — Z13.220 LIPID SCREENING: ICD-10-CM

## 2019-10-17 DIAGNOSIS — Z31.69 INFERTILITY COUNSELING: ICD-10-CM

## 2019-10-17 DIAGNOSIS — E29.1 HYPOGONADISM IN MALE: ICD-10-CM

## 2019-10-17 PROCEDURE — 99204 OFFICE O/P NEW MOD 45 MIN: CPT | Performed by: FAMILY MEDICINE

## 2019-10-17 PROCEDURE — 72072 X-RAY EXAM THORAC SPINE 3VWS: CPT

## 2019-10-17 RX ORDER — FEXOFENADINE HCL 180 MG/1
180 TABLET ORAL DAILY
COMMUNITY
End: 2019-11-01 | Stop reason: SDUPTHER

## 2019-10-17 NOTE — PROGRESS NOTES
Subjective cc: est care for back pain  Luis Garcia Jr. is a 39 y.o. male who presents to establish care for back pain multiple other concerns.    DJD - chronic back pain - had an MRI of his lower back. However he continue to have pain in between his shoulder blades, daily, for several years. Radiates around to chest and shoulder, lays on a softball to help push against pain. He is unaware of imaging on his thoracic spine. Accupuncture, chiropractor, yoga, diclofenac, norco, multiple pain medications, injections in neck which did help back some. He has not been to PT. He does have numbness and tingling in hands.     Hypogonadism: dr jamison started him on testosterone - then went to [facility] and they increased his dose.  Patient is interested in being able to have children.  He would like to see a fertility specialist.    PSH: marlin in right leg, fx left ankle, broken right jaw - steel plates    Former smoker - quit, using chewing tob - used chantix to quit.    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        Review of Systems   Constitutional: Positive for activity change and fatigue. Negative for appetite change, chills, fever and unexpected weight change.   Respiratory: Negative for cough and shortness of breath.    Cardiovascular: Negative for chest pain and leg swelling.   Gastrointestinal: Negative for abdominal pain, blood in stool, constipation, diarrhea, nausea and vomiting.   Musculoskeletal: Positive for  "arthralgias and back pain. Negative for gait problem.   Psychiatric/Behavioral: Positive for dysphoric mood. The patient is nervous/anxious.    All other systems reviewed and are negative.      Objective   Blood pressure 124/90, pulse 64, temperature 98.2 °F (36.8 °C), temperature source Oral, resp. rate 18, height 181.6 cm (71.5\"), weight 95.3 kg (210 lb 3.2 oz), SpO2 98 %.  Physical Exam   Constitutional: He is oriented to person, place, and time. He appears well-developed and well-nourished. No distress.   HENT:   Head: Normocephalic and atraumatic.   Nose: Nose normal.   Mouth/Throat: Oropharynx is clear and moist.   Eyes: Conjunctivae and EOM are normal. Right eye exhibits no discharge. Left eye exhibits no discharge.   Neck: Normal range of motion. No tracheal deviation present. No thyromegaly present.   Cardiovascular: Normal rate, regular rhythm, normal heart sounds and intact distal pulses.   Pulmonary/Chest: Effort normal and breath sounds normal. No stridor. No respiratory distress. He has no wheezes. He exhibits no tenderness.   Abdominal: Soft. Bowel sounds are normal. He exhibits no distension. There is no tenderness.   Musculoskeletal: He exhibits no edema.        Thoracic back: He exhibits decreased range of motion, tenderness and pain.   Lymphadenopathy:     He has no cervical adenopathy.   Neurological: He is alert and oriented to person, place, and time. He exhibits normal muscle tone. Coordination normal.   Skin: Skin is warm and dry. He is not diaphoretic.   Psychiatric: He has a normal mood and affect. His behavior is normal. Judgment and thought content normal.   Nursing note and vitals reviewed.      Assessment/Plan   Problems Addressed this Visit        Endocrine    Hypogonadism in male    Relevant Orders    PSA Screen (Completed)    Testosterone (Completed)    Ambulatory Referral to Infertility       Nervous and Auditory    Chronic midline thoracic back pain    Relevant Orders    XR Spine " Thoracic 3 View (Completed)    MRI thoracic spine wo contrast       Other    Overweight (BMI 25.0-29.9)    Chronic fatigue - Primary    Relevant Orders    CBC & Differential (Completed)    TSH (Completed)    Vitamin D 25 Hydroxy (Completed)    Testosterone (Completed)      Other Visit Diagnoses     Diabetes mellitus screening        Relevant Orders    Comprehensive Metabolic Panel (Completed)    Lipid screening        Relevant Orders    Lipid Panel (Completed)    Infertility counseling        Relevant Orders    Ambulatory Referral to Infertility        Plan:    1.  Chronic fatigue: New to me, chronic for patient.  Uncontrolled.  Will check lab testing and reevaluate.    2.  Hypogonadism: New to me, chronic for patient.  [facility] is currently seeing [facility] for [facility].  He reports his lab testing has not been checked.  Will check testosterone, PSA, lipids, CMP, CBC for further evaluation.  Patient is interested in fertility evaluation.  Referral placed to fertility specialist.    3.  Chronic back pain: New to me, chronic for patient.  Will get an x-ray in office today.  Further treatment pending results of x-ray.    4.  Overweight: Patient's Body mass index is 28.91 kg/m². BMI is above normal parameters. Recommendations include: educational material, exercise counseling and nutrition counseling.    #5 screening: Labs today          This document has been electronically signed by Keely Rizo MD on October 22, 2019 3:09 PM

## 2019-10-18 LAB
25(OH)D3+25(OH)D2 SERPL-MCNC: 21.9 NG/ML (ref 30–100)
ALBUMIN SERPL-MCNC: 4.9 G/DL (ref 3.5–5.2)
ALBUMIN/GLOB SERPL: 1.9 G/DL
ALP SERPL-CCNC: 56 U/L (ref 39–117)
ALT SERPL-CCNC: 30 U/L (ref 1–41)
AST SERPL-CCNC: 19 U/L (ref 1–40)
BASOPHILS # BLD AUTO: 0.06 10*3/MM3 (ref 0–0.2)
BASOPHILS NFR BLD AUTO: 0.8 % (ref 0–1.5)
BILIRUB SERPL-MCNC: 0.8 MG/DL (ref 0.2–1.2)
BUN SERPL-MCNC: 13 MG/DL (ref 6–20)
BUN/CREAT SERPL: 10.6 (ref 7–25)
CALCIUM SERPL-MCNC: 10 MG/DL (ref 8.6–10.5)
CHLORIDE SERPL-SCNC: 98 MMOL/L (ref 98–107)
CHOLEST SERPL-MCNC: 172 MG/DL (ref 0–200)
CO2 SERPL-SCNC: 25.8 MMOL/L (ref 22–29)
CREAT SERPL-MCNC: 1.23 MG/DL (ref 0.76–1.27)
EOSINOPHIL # BLD AUTO: 0.16 10*3/MM3 (ref 0–0.4)
EOSINOPHIL NFR BLD AUTO: 2 % (ref 0.3–6.2)
ERYTHROCYTE [DISTWIDTH] IN BLOOD BY AUTOMATED COUNT: 14.2 % (ref 12.3–15.4)
GLOBULIN SER CALC-MCNC: 2.6 GM/DL
GLUCOSE SERPL-MCNC: 98 MG/DL (ref 65–99)
HCT VFR BLD AUTO: 58.7 % (ref 37.5–51)
HDLC SERPL-MCNC: 49 MG/DL (ref 40–60)
HGB BLD-MCNC: 19.3 G/DL (ref 13–17.7)
IMM GRANULOCYTES # BLD AUTO: 0.01 10*3/MM3 (ref 0–0.05)
IMM GRANULOCYTES NFR BLD AUTO: 0.1 % (ref 0–0.5)
LDLC SERPL CALC-MCNC: 101 MG/DL (ref 0–100)
LYMPHOCYTES # BLD AUTO: 2.06 10*3/MM3 (ref 0.7–3.1)
LYMPHOCYTES NFR BLD AUTO: 26.2 % (ref 19.6–45.3)
MCH RBC QN AUTO: 29.3 PG (ref 26.6–33)
MCHC RBC AUTO-ENTMCNC: 32.9 G/DL (ref 31.5–35.7)
MCV RBC AUTO: 89.1 FL (ref 79–97)
MONOCYTES # BLD AUTO: 0.46 10*3/MM3 (ref 0.1–0.9)
MONOCYTES NFR BLD AUTO: 5.8 % (ref 5–12)
NEUTROPHILS # BLD AUTO: 5.12 10*3/MM3 (ref 1.7–7)
NEUTROPHILS NFR BLD AUTO: 65.1 % (ref 42.7–76)
NRBC BLD AUTO-RTO: 0 /100 WBC (ref 0–0.2)
PLATELET # BLD AUTO: 222 10*3/MM3 (ref 140–450)
POTASSIUM SERPL-SCNC: 5.1 MMOL/L (ref 3.5–5.2)
PROT SERPL-MCNC: 7.5 G/DL (ref 6–8.5)
PSA SERPL-MCNC: 0.85 NG/ML (ref 0–4)
RBC # BLD AUTO: 6.59 10*6/MM3 (ref 4.14–5.8)
SODIUM SERPL-SCNC: 138 MMOL/L (ref 136–145)
TESTOST SERPL-MCNC: 1267 NG/DL (ref 264–916)
TRIGL SERPL-MCNC: 112 MG/DL (ref 0–150)
TSH SERPL DL<=0.005 MIU/L-ACNC: 1.98 UIU/ML (ref 0.27–4.2)
VLDLC SERPL CALC-MCNC: 22.4 MG/DL
WBC # BLD AUTO: 7.87 10*3/MM3 (ref 3.4–10.8)

## 2019-10-21 DIAGNOSIS — G89.29 CHRONIC MIDLINE THORACIC BACK PAIN: Primary | ICD-10-CM

## 2019-10-21 DIAGNOSIS — M54.6 CHRONIC MIDLINE THORACIC BACK PAIN: Primary | ICD-10-CM

## 2019-10-21 NOTE — PROGRESS NOTES
PATIENT NOTIFIED OF RESULTS.  Patient would like to be referred to Pain Management and not be referred  to Physical Therapy

## 2019-10-21 NOTE — PROGRESS NOTES
PATIENT NOTIFIED OF LAB RESULTS.  Patient will be contacting the prescribing provider that writes the Testosterone for him. Patient is suppose to get a shot today of testosterone and stated that he will be holding his injection today. Patient states that he will return in one month to have labs rechecked.

## 2019-10-22 ENCOUNTER — HOSPITAL ENCOUNTER (EMERGENCY)
Facility: HOSPITAL | Age: 39
Discharge: HOME | End: 2019-10-22
Admitting: EMERGENCY MEDICINE

## 2019-10-22 ENCOUNTER — OFFICE VISIT (OUTPATIENT)
Dept: FAMILY MEDICINE CLINIC | Facility: CLINIC | Age: 39
End: 2019-10-22

## 2019-10-22 ENCOUNTER — APPOINTMENT (OUTPATIENT)
Dept: GENERAL RADIOLOGY | Facility: HOSPITAL | Age: 39
End: 2019-10-22

## 2019-10-22 ENCOUNTER — APPOINTMENT (OUTPATIENT)
Dept: CT IMAGING | Facility: HOSPITAL | Age: 39
End: 2019-10-22

## 2019-10-22 VITALS
DIASTOLIC BLOOD PRESSURE: 76 MMHG | HEIGHT: 72 IN | OXYGEN SATURATION: 100 % | BODY MASS INDEX: 28.04 KG/M2 | HEART RATE: 64 BPM | WEIGHT: 207 LBS | SYSTOLIC BLOOD PRESSURE: 139 MMHG | TEMPERATURE: 98.1 F | RESPIRATION RATE: 16 BRPM

## 2019-10-22 VITALS
WEIGHT: 207.6 LBS | RESPIRATION RATE: 18 BRPM | SYSTOLIC BLOOD PRESSURE: 164 MMHG | HEART RATE: 78 BPM | BODY MASS INDEX: 28.12 KG/M2 | HEIGHT: 72 IN | DIASTOLIC BLOOD PRESSURE: 100 MMHG | TEMPERATURE: 98.2 F | OXYGEN SATURATION: 98 %

## 2019-10-22 DIAGNOSIS — D45 POLYCYTHEMIA VERA (HCC): Primary | ICD-10-CM

## 2019-10-22 DIAGNOSIS — D45 POLYCYTHEMIA VERA, ACQUIRED (HCC): ICD-10-CM

## 2019-10-22 DIAGNOSIS — I10 ESSENTIAL HYPERTENSION: ICD-10-CM

## 2019-10-22 DIAGNOSIS — Z79.890 LONG-TERM CURRENT USE OF TESTOSTERONE REPLACEMENT THERAPY: ICD-10-CM

## 2019-10-22 DIAGNOSIS — R42 DIZZINESS: Primary | ICD-10-CM

## 2019-10-22 PROBLEM — G89.29 CHRONIC MIDLINE THORACIC BACK PAIN: Status: ACTIVE | Noted: 2019-10-22

## 2019-10-22 PROBLEM — E66.3 OVERWEIGHT (BMI 25.0-29.9): Status: ACTIVE | Noted: 2019-10-22

## 2019-10-22 PROBLEM — E29.1 HYPOGONADISM IN MALE: Status: ACTIVE | Noted: 2019-10-22

## 2019-10-22 PROBLEM — R53.82 CHRONIC FATIGUE: Status: ACTIVE | Noted: 2019-10-22

## 2019-10-22 PROBLEM — M54.6 CHRONIC MIDLINE THORACIC BACK PAIN: Status: ACTIVE | Noted: 2019-10-22

## 2019-10-22 LAB
ALBUMIN SERPL-MCNC: 4.7 G/DL (ref 3.5–5.2)
ALBUMIN/GLOB SERPL: 1.5 G/DL
ALP SERPL-CCNC: 56 U/L (ref 39–117)
ALT SERPL W P-5'-P-CCNC: 23 U/L (ref 1–41)
ANION GAP SERPL CALCULATED.3IONS-SCNC: 13 MMOL/L (ref 5–15)
AST SERPL-CCNC: 19 U/L (ref 1–40)
BASOPHILS # BLD AUTO: 0.05 10*3/MM3 (ref 0–0.2)
BASOPHILS NFR BLD AUTO: 0.5 % (ref 0–1.5)
BILIRUB SERPL-MCNC: 0.9 MG/DL (ref 0.2–1.2)
BUN BLD-MCNC: 12 MG/DL (ref 6–20)
BUN/CREAT SERPL: 9.8 (ref 7–25)
CALCIUM SPEC-SCNC: 9.5 MG/DL (ref 8.6–10.5)
CHLORIDE SERPL-SCNC: 99 MMOL/L (ref 98–107)
CO2 SERPL-SCNC: 27 MMOL/L (ref 22–29)
CREAT BLD-MCNC: 1.22 MG/DL (ref 0.76–1.27)
DEPRECATED RDW RBC AUTO: 42.3 FL (ref 37–54)
EOSINOPHIL # BLD AUTO: 0.05 10*3/MM3 (ref 0–0.4)
EOSINOPHIL NFR BLD AUTO: 0.5 % (ref 0.3–6.2)
ERYTHROCYTE [DISTWIDTH] IN BLOOD BY AUTOMATED COUNT: 13.4 % (ref 12.3–15.4)
GFR SERPL CREATININE-BSD FRML MDRD: 67 ML/MIN/1.73
GLOBULIN UR ELPH-MCNC: 3.1 GM/DL
GLUCOSE BLD-MCNC: 93 MG/DL (ref 65–99)
HCT VFR BLD AUTO: 54.5 % (ref 37.5–51)
HGB BLD-MCNC: 18.3 G/DL (ref 13–17.7)
IMM GRANULOCYTES # BLD AUTO: 0.02 10*3/MM3 (ref 0–0.05)
IMM GRANULOCYTES NFR BLD AUTO: 0.2 % (ref 0–0.5)
LYMPHOCYTES # BLD AUTO: 1.53 10*3/MM3 (ref 0.7–3.1)
LYMPHOCYTES NFR BLD AUTO: 15.3 % (ref 19.6–45.3)
MCH RBC QN AUTO: 29.3 PG (ref 26.6–33)
MCHC RBC AUTO-ENTMCNC: 33.6 G/DL (ref 31.5–35.7)
MCV RBC AUTO: 87.2 FL (ref 79–97)
MONOCYTES # BLD AUTO: 0.52 10*3/MM3 (ref 0.1–0.9)
MONOCYTES NFR BLD AUTO: 5.2 % (ref 5–12)
NEUTROPHILS # BLD AUTO: 7.86 10*3/MM3 (ref 1.7–7)
NEUTROPHILS NFR BLD AUTO: 78.3 % (ref 42.7–76)
NRBC BLD AUTO-RTO: 0 /100 WBC (ref 0–0.2)
PLATELET # BLD AUTO: 251 10*3/MM3 (ref 140–450)
PMV BLD AUTO: 11.4 FL (ref 6–12)
POTASSIUM BLD-SCNC: 4.1 MMOL/L (ref 3.5–5.2)
PROT SERPL-MCNC: 7.8 G/DL (ref 6–8.5)
RBC # BLD AUTO: 6.25 10*6/MM3 (ref 4.14–5.8)
SODIUM BLD-SCNC: 139 MMOL/L (ref 136–145)
WBC NRBC COR # BLD: 10.03 10*3/MM3 (ref 3.4–10.8)
WHOLE BLOOD HOLD SPECIMEN: NORMAL

## 2019-10-22 PROCEDURE — 99214 OFFICE O/P EST MOD 30 MIN: CPT | Performed by: FAMILY MEDICINE

## 2019-10-22 PROCEDURE — 93010 ELECTROCARDIOGRAM REPORT: CPT | Performed by: INTERNAL MEDICINE

## 2019-10-22 PROCEDURE — 70450 CT HEAD/BRAIN W/O DYE: CPT

## 2019-10-22 PROCEDURE — 93005 ELECTROCARDIOGRAM TRACING: CPT | Performed by: PHYSICIAN ASSISTANT

## 2019-10-22 PROCEDURE — 71045 X-RAY EXAM CHEST 1 VIEW: CPT

## 2019-10-22 PROCEDURE — 99283 EMERGENCY DEPT VISIT LOW MDM: CPT

## 2019-10-22 PROCEDURE — 85025 COMPLETE CBC W/AUTO DIFF WBC: CPT | Performed by: PHYSICIAN ASSISTANT

## 2019-10-22 PROCEDURE — 80053 COMPREHEN METABOLIC PANEL: CPT | Performed by: PHYSICIAN ASSISTANT

## 2019-10-22 RX ADMIN — SODIUM CHLORIDE 1000 ML: 9 INJECTION, SOLUTION INTRAVENOUS at 17:43

## 2019-10-22 NOTE — PROGRESS NOTES
"Subjective cc\" \"dont feel right\"   Luis Garcia Jr. is a 39 y.o. male who presents with complaint of \"not feeling right.\"  Patient reports he started feeling bad yesterday morning approximately 10 AM.  He feels very jittery, unsteady, dizzy when he tries to get up and walk.  He feels like his balance is off.  He also feels like he has some left-sided weakness.  He had another episode yesterday while he was driving and felt like he would need to stop.  He was advised yesterday of his lab results.  He is [facility].  We rechecked his lab testing which revealed elevated hemoglobin and hematocrit as well as elevated testosterone levels.  He thought this feeling might be stress from being told of results.  However he reports all of this started earlier yesterday morning before he was told so he does not feel like it is anxiety related.  He also complains of some shortness of air, no difficulty with speech, no vision changes.  He reports never feeling like this before.    Fingers feel cold, left inner arm is painful that runs all the way down to his left thumb- he has had tennis elbow, his left thumb is drawing up.   Intermittent sharp pain under his left jaw. He has had pain like this before.       No prior heart issues. He has had a stress ECHO - this was within the past 3 years - done at [facility].  Reviewed results.   No CVA in the past.   Former smoker    History of Present Illness     The following portions of the patient's history were reviewed and updated as appropriate: allergies, current medications, past family history, past medical history, past social history, past surgical history and problem list.        Review of Systems   Constitutional: Positive for activity change and fatigue. Negative for chills, diaphoresis and fever.   Eyes: Negative for visual disturbance.   Respiratory: Positive for shortness of breath. Negative for cough and chest tightness.  " "  Cardiovascular: Negative for chest pain, palpitations and leg swelling.   Gastrointestinal: Negative for abdominal pain, constipation, diarrhea, nausea and vomiting.   Musculoskeletal: Positive for arthralgias, back pain, myalgias and neck pain.   Skin: Negative for color change, rash and wound.   Neurological: Positive for dizziness, weakness, light-headedness, numbness and headaches. Negative for seizures, syncope, facial asymmetry and speech difficulty.   Psychiatric/Behavioral: The patient is nervous/anxious.    All other systems reviewed and are negative.      Objective   Blood pressure 164/100, pulse 78, temperature 98.2 °F (36.8 °C), temperature source Oral, resp. rate 18, height 182.9 cm (72\"), weight 94.2 kg (207 lb 9.6 oz), SpO2 98 %.  Physical Exam   Constitutional: He is oriented to person, place, and time. He appears well-developed and well-nourished. No distress.   Patient does appear different today than at his last appointment.  He answers questions appropriately and responds to task.  However all of his answers and movements appears slower than last week.   HENT:   Head: Normocephalic and atraumatic.   Right Ear: External ear normal.   Left Ear: External ear normal.   Nose: Nose normal.   Mouth/Throat: Oropharynx is clear and moist. No oropharyngeal exudate.   Eyes: Conjunctivae and EOM are normal. Pupils are equal, round, and reactive to light. Right eye exhibits no discharge. Left eye exhibits no discharge.   Neck: Normal range of motion. Neck supple. No JVD present. No tracheal deviation present. No thyromegaly present.   Cardiovascular: Normal rate, regular rhythm, normal heart sounds and intact distal pulses.   No murmur heard.  Pulmonary/Chest: Effort normal and breath sounds normal. No stridor. No respiratory distress. He has no wheezes.   Abdominal: Soft. Bowel sounds are normal. He exhibits no distension. There is no tenderness.   Musculoskeletal: Normal range of motion. "   Lymphadenopathy:     He has no cervical adenopathy.   Neurological: He is alert and oriented to person, place, and time. No cranial nerve deficit. He exhibits normal muscle tone. Coordination normal.   Skin: Skin is warm and dry. No rash noted. He is not diaphoretic.   Psychiatric: Judgment and thought content normal. His mood appears anxious. Cognition and memory are normal.   Nursing note and vitals reviewed.      Assessment/Plan   Problems Addressed this Visit        Cardiovascular and Mediastinum    Essential hypertension       Hematopoietic and Hemostatic    Polycythemia vera, acquired      Other Visit Diagnoses     Dizziness    -  Primary    Long-term current use of testosterone replacement therapy              Plan:    1.  Dizziness: New, needs further evaluation.  Patient presents to office today of just not feeling right.  He does appear different than when I saw him last week.  He complains of his balance being off, unsteady on his feet, and dizziness.  This was sudden in onset and has not improved.  Patient does have elevated blood pressure today.  Was noted to have elevated hemoglobin hematocrit.  Is on testosterone replacement.  Advised patient that this could potentially put him at an increased risk for stroke.  Recommended further evaluation in the emergency department to rule out any neurologic or cardiovascular event.  Patient's girlfriend will be driving him to the hospital.    2.  Elevated blood pressure: New, patient's blood pressure was well-controlled at his last visit.  It is significantly elevated today.  This could be contributing to some of the symptoms that he is having.  Will need to be placed on blood pressure medication if it continues to be elevated.    3.  Polycythemia vera: New, reviewed patient's lab testing.  Most likely a side effect of testosterone replacement.  Advised patient that in my opinion he is on a high dose of testosterone replacement and this will need to be  decreased and see if his levels return to normal.  Advised on risk.    4.  Testosterone replacement: Chronic, currently under treatment at [facility]          This document has been electronically signed by Keely Rizo MD on October 22, 2019 3:04 PM

## 2019-10-23 NOTE — ED PROVIDER NOTES
Subjective   History of Present Illness  39-year-old male presents with multiple complaints.  He reports left arm numbness and tingling tingling to his left thumb lightheadedness headaches weakness fatigue.  Patient reports he does receive testosterone injections routinely.  He had seen his primary care doctor today about the symptoms that started yesterday and she had referred him to the emergency room time he was high risk for cardiovascular events and stroke.  Review of Systems   All other systems reviewed and are negative.      Past Medical History:   Diagnosis Date   • Cervical herniated disc    • Essential hypertension 10/22/2019   • Leukoplakia of tongue    • Lumbar herniated disc    • Lyme disease    • [place] spotted fever    • Varicocele        Allergies   Allergen Reactions   • Shrimp Other (See Comments)     Pt unsure     • Topamax [Topiramate] Unknown (See Comments)     Patient states he has blurry vision   • Toradol [Ketorolac Tromethamine] Palpitations       Past Surgical History:   Procedure Laterality Date   • ANKLE SURGERY     • FEMUR SURGERY     • MANDIBLE FRACTURE SURGERY         Family History   Problem Relation Age of Onset   • Heart disease Paternal Grandmother    • Anxiety disorder Mother    • Hypertension Father    • Cancer Maternal Grandmother        Social History     Socioeconomic History   • Marital status:      Spouse name: Not on file   • Number of children: Not on file   • Years of education: Not on file   • Highest education level: Not on file   Tobacco Use   • Smoking status: Former Smoker   • Smokeless tobacco: Current User     Types: Chew     Last attempt to quit: 11/1/2018   Substance and Sexual Activity   • Alcohol use: Yes     Comment: Social    • Drug use: No   • Sexual activity: Defer     Partners: Female           Objective   Physical Exam   Constitutional: He is oriented to person, place, and time. He appears well-developed and well-nourished.   HENT:   Head:  Normocephalic and atraumatic.   Eyes: EOM are normal. Pupils are equal, round, and reactive to light.   Neck: Normal range of motion. Neck supple.   Cardiovascular: Normal rate and regular rhythm.   Pulmonary/Chest: Effort normal and breath sounds normal.   Abdominal: Soft. Bowel sounds are normal.   Musculoskeletal: Normal range of motion.   Neurological: He is alert and oriented to person, place, and time. No cranial nerve deficit. Coordination normal.   Finger-to-nose is within normal limits, patient is able to adequately perform heel-to-shin, negative Romberg sign   Skin: Skin is warm. Capillary refill takes less than 2 seconds.   Psychiatric: He has a normal mood and affect. His behavior is normal.   Nursing note and vitals reviewed.      Procedures           ED Course        Labs Reviewed   CBC WITH AUTO DIFFERENTIAL - Abnormal; Notable for the following components:       Result Value    RBC 6.25 (*)     Hemoglobin 18.3 (*)     Hematocrit 54.5 (*)     Neutrophil % 78.3 (*)     Lymphocyte % 15.3 (*)     Neutrophils, Absolute 7.86 (*)     All other components within normal limits   COMPREHENSIVE METABOLIC PANEL    Narrative:     GFR Normal >60  Chronic Kidney Disease <60  Kidney Failure <15   RAINBOW DRAW    Narrative:     The following orders were created for panel order [place] Draw.  Procedure                               Abnormality         Status                     ---------                               -----------         ------                     Light Blue Top[[id number]]                                   Final result               Red Top[[id number]]                                          In process                   Please view results for these tests on the individual orders.   CBC AND DIFFERENTIAL    Narrative:     The following orders were created for panel order CBC & Differential.  Procedure                               Abnormality         Status                     ---------                                -----------         ------                     CBC Auto Differential[[id number]]        Abnormal            Final result                 Please view results for these tests on the individual orders.   LIGHT BLUE TOP   RED TOP     CT Head Without Contrast   Final Result   No hemorrhage, edema or mass effect. No acute findings.       The full report of this exam was immediately signed and available to the   emergency room. The patient is currently in the emergency room.       This report was finalized on 10/22/2019 19:01 by Dr. Joaquin Morgan MD.      XR Chest 1 View   Final Result   1. No acute appearing infiltrate.   2. Mild bronchial wall thickening, stable.           This report was finalized on 10/22/2019 17:43 by Dr. Joaquin Morgan MD.                  MDM  Number of Diagnoses or Management Options  Diagnosis management comments: Examination here is unremarkable strength is equal bilaterally.  Patient be discharged in stable condition       Amount and/or Complexity of Data Reviewed  Clinical lab tests: reviewed and ordered  Tests in the radiology section of CPT®: ordered and reviewed  Tests in the medicine section of CPT®: ordered and reviewed    Risk of Complications, Morbidity, and/or Mortality  Presenting problems: moderate  Diagnostic procedures: moderate  Management options: moderate    Patient Progress  Patient progress: stable      Final diagnoses:   Polycythemia vera (CMS/[facility])              Sanchez Stratton PA-C  10/22/19 1920

## 2019-10-24 ENCOUNTER — HOSPITAL ENCOUNTER (OUTPATIENT)
Dept: MRI IMAGING | Facility: HOSPITAL | Age: 39
Discharge: HOME | End: 2019-10-24
Admitting: FAMILY MEDICINE

## 2019-10-24 ENCOUNTER — OFFICE VISIT (OUTPATIENT)
Dept: FAMILY MEDICINE CLINIC | Facility: CLINIC | Age: 39
End: 2019-10-24

## 2019-10-24 VITALS
HEART RATE: 71 BPM | TEMPERATURE: 97.7 F | WEIGHT: 209.2 LBS | SYSTOLIC BLOOD PRESSURE: 134 MMHG | DIASTOLIC BLOOD PRESSURE: 84 MMHG | RESPIRATION RATE: 18 BRPM | OXYGEN SATURATION: 99 % | BODY MASS INDEX: 28.33 KG/M2 | HEIGHT: 72 IN

## 2019-10-24 DIAGNOSIS — F33.1 MODERATE EPISODE OF RECURRENT MAJOR DEPRESSIVE DISORDER (HCC): Primary | ICD-10-CM

## 2019-10-24 DIAGNOSIS — R53.82 CHRONIC FATIGUE: ICD-10-CM

## 2019-10-24 DIAGNOSIS — G89.29 CHRONIC MIDLINE THORACIC BACK PAIN: ICD-10-CM

## 2019-10-24 DIAGNOSIS — M54.6 CHRONIC MIDLINE THORACIC BACK PAIN: ICD-10-CM

## 2019-10-24 DIAGNOSIS — E29.1 HYPOGONADISM IN MALE: ICD-10-CM

## 2019-10-24 PROCEDURE — 99214 OFFICE O/P EST MOD 30 MIN: CPT | Performed by: FAMILY MEDICINE

## 2019-10-24 PROCEDURE — 72146 MRI CHEST SPINE W/O DYE: CPT

## 2019-10-24 RX ORDER — DULOXETIN HYDROCHLORIDE 30 MG/1
30 CAPSULE, DELAYED RELEASE ORAL DAILY
Qty: 90 CAPSULE | Refills: 0 | Status: SHIPPED | OUTPATIENT
Start: 2019-10-24 | End: 2019-11-25 | Stop reason: SDDI

## 2019-10-24 NOTE — PROGRESS NOTES
Subjective  cc: depression   Luis Garcia Jr. is a 39 y.o. male who presents to discuss depression.  He is anxious about his health.   He feels exhausted all the time, fatigued - thought it was his testostrone but now it is high and he doesn't feel much better. His mental state is foggy. concentration is impaired.    Has taken multiple medications in the past - celexa, prozac,   Feels like he gets irritated easily, mind sped up.   Unsure about what trigger is.   Got  3-4 years ago - very stressful marriage.   No suicide attempts. No thoughts.   He has never been hospitalized for mood.     Mother is treated for psych disorders    He is worried about his testosterone replacement.  His hemoglobin and hematocrit were elevated.  His testosterone level is also very elevated.  We have discussed why this is not ideal.  Especially since it is not improving his symptoms.  Patient plans to gradually space out his testosterone injections instead of twice per week decreased to once per week.  His goal will be to eventually get to 1 injection every 2 weeks.    He continues to have chronic back pain.  MRI has been approved and we are waiting to get scheduled.    Depression   Visit Type: initial  Onset of symptoms: more than 5 years ago  Progression since onset: gradually worsening  Patient presents with the following symptoms: anhedonia, decreased concentration, depressed mood, excessive worry, fatigue, insomnia, irritability, memory impairment, muscle tension and nervousness/anxiety.  Patient is not experiencing: chest pain, choking sensation, compulsions, confusion, dizziness, dry mouth, feelings of hopelessness, feelings of worthlessness, nausea, obsessions, suicidal ideas, suicidal planning and thoughts of death.  Frequency of symptoms: constantly   Severity: interfering with daily activities   Sleep quality: non-restorative  Treatment tried: SSRI (celexa, prozac, lexapro, wellbutrin)  Compliance with treatment:  "variable  Improvement on treatment: no relief           The following portions of the patient's history were reviewed and updated as appropriate: allergies, current medications, past family history, past medical history, past social history, past surgical history and problem list.        Review of Systems   Constitutional: Positive for activity change, fatigue and irritability.   Respiratory: Negative for choking.    Musculoskeletal: Positive for arthralgias, back pain and myalgias.   Neurological: Positive for dizziness.   Psychiatric/Behavioral: Positive for behavioral problems, decreased concentration, dysphoric mood and sleep disturbance. Negative for agitation, confusion, hallucinations, self-injury and suicidal ideas. The patient is nervous/anxious and has insomnia. The patient is not hyperactive.    All other systems reviewed and are negative.      Objective   Blood pressure 134/84, pulse 71, temperature 97.7 °F (36.5 °C), temperature source Oral, resp. rate 18, height 182.9 cm (72\"), weight 94.9 kg (209 lb 3.2 oz), SpO2 99 %.  Physical Exam   Constitutional: He is oriented to person, place, and time. He appears well-developed and well-nourished. No distress.   HENT:   Head: Normocephalic and atraumatic.   Nose: Nose normal.   Mouth/Throat: Oropharynx is clear and moist.   Eyes: Conjunctivae and EOM are normal. Right eye exhibits no discharge. Left eye exhibits no discharge.   Neck: Normal range of motion. No tracheal deviation present. No thyromegaly present.   Cardiovascular: Normal rate, regular rhythm, normal heart sounds and intact distal pulses.   Pulmonary/Chest: Effort normal and breath sounds normal. No stridor. No respiratory distress. He has no wheezes. He exhibits no tenderness.   Abdominal: Soft. Bowel sounds are normal. He exhibits no distension. There is no tenderness.   Musculoskeletal: He exhibits no edema.        Thoracic back: He exhibits decreased range of motion and pain. "   Lymphadenopathy:     He has no cervical adenopathy.   Neurological: He is alert and oriented to person, place, and time. He exhibits normal muscle tone. Coordination normal.   Skin: Skin is warm and dry. He is not diaphoretic.   Psychiatric: His speech is normal and behavior is normal. Judgment and thought content normal. His mood appears anxious. He exhibits a depressed mood.   Nursing note and vitals reviewed.      Assessment/Plan   Problems Addressed this Visit        Endocrine    Hypogonadism in male       Nervous and Auditory    Chronic midline thoracic back pain       Other    Chronic fatigue      Other Visit Diagnoses     Moderate episode of recurrent major depressive disorder (CMS/HCC)    -  Primary    Relevant Medications    DULoxetine (CYMBALTA) 30 MG capsule        Plan:    1.  Depression: New, uncontrolled.  Will start patient on Cymbalta.  Recheck in 6 weeks.  Discussed the option of counseling.  Patient declines at this time.  Advised on risk benefits and side effects of the medication.  Contact the office with any concerns.    2.  Chronic fatigue: Chronic, uncontrolled.  Reviewed multiple possibilities of fatigue.  Patient's testosterone level is above goal so do not feel that that is contributing.  Patient's thyroid level was normal.  Considered sleep apnea as a potential diagnosis because he does have multiple sleep issues.  However I do not feel that this would be explaining all of his symptoms.  Therefore we will treat the depression and see how his symptoms improve.  If he continues to have chronic fatigue would consider a sleep referral at that time.    3.  Chronic thoracic back pain: Chronic, uncontrolled.  MRI approved.  Waiting on scheduling.  Cymbalta can help with pain.    4.  Hypogonadism: Chronic, uncontrolled.  Patient will try weaning himself down.  Instead of testosterone injections twice per week will change to once weekly.          This document has been electronically signed by  Keely Rizo MD on October 24, 2019 10:57 AM

## 2019-10-25 DIAGNOSIS — G89.29 CHRONIC MIDLINE THORACIC BACK PAIN: Primary | ICD-10-CM

## 2019-10-25 DIAGNOSIS — M54.6 CHRONIC MIDLINE THORACIC BACK PAIN: Primary | ICD-10-CM

## 2019-10-25 NOTE — PROGRESS NOTES
PATIENT NOTIFIED OF  RESULTS.  Patient would like to be referred to [facility] for evaluation prior to being referred to Pain Management Please review

## 2019-11-01 ENCOUNTER — TELEPHONE (OUTPATIENT)
Dept: FAMILY MEDICINE CLINIC | Facility: CLINIC | Age: 39
End: 2019-11-01

## 2019-11-01 RX ORDER — FEXOFENADINE HCL 180 MG/1
180 TABLET ORAL DAILY
Qty: 30 TABLET | Refills: 2 | Status: SHIPPED | OUTPATIENT
Start: 2019-11-01 | End: 2020-02-10

## 2019-11-22 ENCOUNTER — TELEPHONE (OUTPATIENT)
Dept: FAMILY MEDICINE CLINIC | Facility: CLINIC | Age: 39
End: 2019-11-22

## 2019-11-22 NOTE — TELEPHONE ENCOUNTER
Patient's girlfriend called and stated that the DULoxetine (CYMBALTA) 30 MG capsule that he is taking is not helping his anxiety. She wants to know if he could be switched over to Trintellix.    Please call patient [phone]

## 2019-11-25 ENCOUNTER — OFFICE VISIT (OUTPATIENT)
Dept: FAMILY MEDICINE CLINIC | Facility: CLINIC | Age: 39
End: 2019-11-25

## 2019-11-25 VITALS
HEART RATE: 72 BPM | OXYGEN SATURATION: 98 % | BODY MASS INDEX: 27.55 KG/M2 | RESPIRATION RATE: 18 BRPM | SYSTOLIC BLOOD PRESSURE: 146 MMHG | WEIGHT: 203.4 LBS | HEIGHT: 72 IN | DIASTOLIC BLOOD PRESSURE: 82 MMHG | TEMPERATURE: 98.9 F

## 2019-11-25 DIAGNOSIS — E29.1 HYPOGONADISM IN MALE: ICD-10-CM

## 2019-11-25 DIAGNOSIS — F17.200 NICOTINE DEPENDENCE WITH CURRENT USE: ICD-10-CM

## 2019-11-25 DIAGNOSIS — F33.1 MODERATE EPISODE OF RECURRENT MAJOR DEPRESSIVE DISORDER (HCC): Primary | ICD-10-CM

## 2019-11-25 DIAGNOSIS — D58.2 ELEVATED HEMOGLOBIN (HCC): ICD-10-CM

## 2019-11-25 PROCEDURE — 99214 OFFICE O/P EST MOD 30 MIN: CPT | Performed by: NURSE PRACTITIONER

## 2019-11-25 RX ORDER — BUPROPION HYDROCHLORIDE 150 MG/1
TABLET ORAL
Qty: 60 TABLET | Refills: 0 | Status: CANCELLED | OUTPATIENT
Start: 2019-11-25

## 2019-11-25 RX ORDER — NICOTINE 21 MG/24HR
1 PATCH, TRANSDERMAL 24 HOURS TRANSDERMAL EVERY 24 HOURS
Qty: 21 PATCH | Refills: 0 | Status: SHIPPED | OUTPATIENT
Start: 2019-11-25 | End: 2020-02-24 | Stop reason: SDDI

## 2019-11-25 NOTE — PROGRESS NOTES
CC: depression, ED    History:  Luis Garcia Jr. is a 39 y.o. male who presents today for evaluation of the above problems.      Has been on  Cymbalta for one month.  Could not tell any change at all.  Has severe anxiety and irritability as well as depression and fatigue. In the past has taken Lexapro, Celexa, Prozac, and Wellbutrin, and diazepam. These did not help him at all.     Has decreased testosterone on his own after level was elevated here a month ago, but hasn't had levels checked. Dose on his bottle says 0.5 mL two days per week. Srength was 200 mg/mL.  He states he has been taking 0.25 one time per week.  He has had significantly increased fatigue since decreasing dose.   Has been getting testosterone from [facility] in [place].  Would like Dr. Rizo to manage this.  Has been on testosterone and hcg replacement for about a year and a half.    In addition to this he is complaining of decreased libido and erectile dysfunction.        HPI  ROS:  Review of Systems   Constitutional: Positive for fatigue.   Genitourinary:        Decreased libido and ED   Psychiatric/Behavioral: Positive for agitation and dysphoric mood. The patient is nervous/anxious.        Allergies   Allergen Reactions   • Shrimp Other (See Comments)     Pt unsure     • Topamax [Topiramate] Unknown (See Comments)     Patient states he has blurry vision   • Toradol [Ketorolac Tromethamine] Palpitations     Past Medical History:   Diagnosis Date   • Cervical herniated disc    • Essential hypertension 10/22/2019   • Leukoplakia of tongue    • Lumbar herniated disc    • Lyme disease    • [place] spotted fever    • Varicocele      Past Surgical History:   Procedure Laterality Date   • ANKLE SURGERY     • FEMUR SURGERY     • MANDIBLE FRACTURE SURGERY       Family History   Problem Relation Age of Onset   • Heart disease Paternal Grandmother    • Anxiety disorder Mother    • Hypertension Father    • Cancer Maternal Grandmother        "reports that he has quit smoking. His smokeless tobacco use includes chew. He reports that he drinks alcohol. He reports that he does not use drugs.      Current Outpatient Medications:   •  Chorionic Gonadotropin 02619 units reconstituted solution, Inject  as directed 2 (Two) Times a Week. Mix and give 0.125ml or 12.5u, Disp: , Rfl:   •  fexofenadine (ALLEGRA) 180 MG tablet, Take 1 tablet by mouth Daily., Disp: 30 tablet, Rfl: 2  •  TESTOSTERONE CYPIONATE IJ, Inject  as directed 2 (Two) Times a Week., Disp: , Rfl:   •  triamcinolone (KENALOG) 0.1 % paste, Apply to tongue 1-2 times daily, Disp: 5 g, Rfl: 3  •  nicotine (NICODERM CQ) 14 MG/24HR patch, Place 1 patch on the skin as directed by provider Daily., Disp: 21 patch, Rfl: 0  •  nicotine (NICODERM CQ) 21 MG/24HR patch, Place 1 patch on the skin as directed by provider Daily., Disp: 21 patch, Rfl: 0  •  nicotine (NICODERM CQ) 7 MG/24HR patch, Place 1 patch on the skin as directed by provider Daily., Disp: 21 patch, Rfl: 0  •  Vortioxetine HBr (TRINTELLIX) 10 MG tablet, Take 10 mg by mouth Daily., Disp: 30 tablet, Rfl: 0    OBJECTIVE:  /82 (BP Location: Right arm, Patient Position: Sitting, Cuff Size: Adult)   Pulse 72   Temp 98.9 °F (37.2 °C) (Oral)   Resp 18   Ht 182.9 cm (72\")   Wt 92.3 kg (203 lb 6.4 oz)   SpO2 98%   BMI 27.59 kg/m²    Physical Exam   Constitutional: He is oriented to person, place, and time. Vital signs are normal. He appears well-developed and well-nourished.   Cardiovascular: Normal rate.   Pulmonary/Chest: Effort normal.   Neurological: He is alert and oriented to person, place, and time.   Psychiatric: He has a normal mood and affect. His behavior is normal.   Vitals reviewed.      Assessment/Plan    Luis was seen today for depression, nicotine dependence and erectile dysfunction.    Diagnoses and all orders for this visit:    Moderate episode of recurrent major depressive disorder (CMS/HCC)  -     Vortioxetine HBr " (TRINTELLIX) 10 MG tablet; Take 10 mg by mouth Daily.    Elevated hemoglobin (CMS/HCC)  -     CBC & Differential    Nicotine dependence with current use  -     nicotine (NICODERM CQ) 21 MG/24HR patch; Place 1 patch on the skin as directed by provider Daily.  -     nicotine (NICODERM CQ) 14 MG/24HR patch; Place 1 patch on the skin as directed by provider Daily.  -     nicotine (NICODERM CQ) 7 MG/24HR patch; Place 1 patch on the skin as directed by provider Daily.    Hypogonadism in male  -     Testosterone; Future      CBC today to reevaluate hemoglobin since he has decreased his testosterone.  Will try Trintellix.  May need prior auth.  Follow up in one month for efficacy.   Discussed maybe adding wellbutrin for tobacco cessation.  Do not want to start two mood altering medications at once, so will try patches for now.  Spent 3-10 minutes discussing tobacco cessation with patient. Discussed that at times medications used for the treatment of anxiety and depression may cause suicidal ideation.  Patient denies this at this time, but was advised that should this occur it is a medical emergency and should be treated as such.    Advised that he would have to discuss testosterone replacement with Dr. Rizo.  Will order level for recheck.  Advised that he come in early morning to have this level drawn.      An After Visit Summary was printed and given to the patient at discharge.  Return in about 1 month (around 12/25/2019).       NORRIS Gallego 11/25/2019    Electronically signed.

## 2019-11-26 LAB
BASOPHILS # BLD AUTO: 0.04 10*3/MM3 (ref 0–0.2)
BASOPHILS NFR BLD AUTO: 0.6 % (ref 0–1.5)
EOSINOPHIL # BLD AUTO: 0.11 10*3/MM3 (ref 0–0.4)
EOSINOPHIL NFR BLD AUTO: 1.6 % (ref 0.3–6.2)
ERYTHROCYTE [DISTWIDTH] IN BLOOD BY AUTOMATED COUNT: 13.6 % (ref 12.3–15.4)
HCT VFR BLD AUTO: 57.1 % (ref 37.5–51)
HGB BLD-MCNC: 18.6 G/DL (ref 13–17.7)
IMM GRANULOCYTES # BLD AUTO: 0.01 10*3/MM3 (ref 0–0.05)
IMM GRANULOCYTES NFR BLD AUTO: 0.1 % (ref 0–0.5)
LYMPHOCYTES # BLD AUTO: 1.75 10*3/MM3 (ref 0.7–3.1)
LYMPHOCYTES NFR BLD AUTO: 25.1 % (ref 19.6–45.3)
MCH RBC QN AUTO: 28.7 PG (ref 26.6–33)
MCHC RBC AUTO-ENTMCNC: 32.6 G/DL (ref 31.5–35.7)
MCV RBC AUTO: 88.3 FL (ref 79–97)
MONOCYTES # BLD AUTO: 0.48 10*3/MM3 (ref 0.1–0.9)
MONOCYTES NFR BLD AUTO: 6.9 % (ref 5–12)
NEUTROPHILS # BLD AUTO: 4.59 10*3/MM3 (ref 1.7–7)
NEUTROPHILS NFR BLD AUTO: 65.7 % (ref 42.7–76)
NRBC BLD AUTO-RTO: 0 /100 WBC (ref 0–0.2)
PLATELET # BLD AUTO: 258 10*3/MM3 (ref 140–450)
RBC # BLD AUTO: 6.47 10*6/MM3 (ref 4.14–5.8)
WBC # BLD AUTO: 6.98 10*3/MM3 (ref 3.4–10.8)

## 2019-11-27 ENCOUNTER — TELEPHONE (OUTPATIENT)
Dept: FAMILY MEDICINE CLINIC | Facility: CLINIC | Age: 39
End: 2019-11-27

## 2019-11-27 NOTE — TELEPHONE ENCOUNTER
Voicemail left from pt girlfriend stating that pt medication is not covered. Called pharmacy to have a prior authorization sent over. Advised patient we will start authorization once received. Advised patient that PA can take 72hours. Offered to have pt  a 5day supply to get patient through long weekend. Patient declined

## 2019-11-30 ENCOUNTER — RESULTS ENCOUNTER (OUTPATIENT)
Dept: FAMILY MEDICINE CLINIC | Facility: CLINIC | Age: 39
End: 2019-11-30

## 2019-11-30 DIAGNOSIS — E29.1 HYPOGONADISM IN MALE: ICD-10-CM

## 2019-12-10 ENCOUNTER — TELEPHONE (OUTPATIENT)
Dept: FAMILY MEDICINE CLINIC | Facility: CLINIC | Age: 39
End: 2019-12-10

## 2019-12-10 NOTE — TELEPHONE ENCOUNTER
Called pharmacy and canceled order under Shreya Escalante. New prescription called in under Keely coughlin.

## 2019-12-10 NOTE — TELEPHONE ENCOUNTER
Called pharmacy again requesting PA paper so that authroization can be done. Spoke with patient who reports that he remains on Cymbalta.

## 2019-12-11 ENCOUNTER — TELEPHONE (OUTPATIENT)
Dept: FAMILY MEDICINE CLINIC | Facility: CLINIC | Age: 39
End: 2019-12-11

## 2019-12-11 DIAGNOSIS — D58.2 ELEVATED HEMOGLOBIN (HCC): Primary | ICD-10-CM

## 2019-12-11 DIAGNOSIS — E29.1 HYPOGONADISM IN MALE: Primary | ICD-10-CM

## 2019-12-11 NOTE — TELEPHONE ENCOUNTER
Patient is no longer going to the [facility] patient is needing to be referred to Urology for management of his Testosterone, ED, patient would like to be referred to Dr Nguyen at [facility].

## 2019-12-20 ENCOUNTER — OFFICE VISIT (OUTPATIENT)
Dept: FAMILY MEDICINE CLINIC | Facility: CLINIC | Age: 39
End: 2019-12-20

## 2019-12-20 VITALS
HEART RATE: 67 BPM | SYSTOLIC BLOOD PRESSURE: 128 MMHG | RESPIRATION RATE: 20 BRPM | BODY MASS INDEX: 27.25 KG/M2 | DIASTOLIC BLOOD PRESSURE: 82 MMHG | WEIGHT: 201.2 LBS | OXYGEN SATURATION: 99 % | HEIGHT: 72 IN | TEMPERATURE: 98.1 F

## 2019-12-20 DIAGNOSIS — F41.9 ANXIETY AND DEPRESSION: Primary | ICD-10-CM

## 2019-12-20 DIAGNOSIS — E66.3 OVERWEIGHT (BMI 25.0-29.9): ICD-10-CM

## 2019-12-20 DIAGNOSIS — I10 ESSENTIAL HYPERTENSION: ICD-10-CM

## 2019-12-20 DIAGNOSIS — F32.A ANXIETY AND DEPRESSION: Primary | ICD-10-CM

## 2019-12-20 DIAGNOSIS — Z72.0 TOBACCO ABUSE: ICD-10-CM

## 2019-12-20 DIAGNOSIS — D45 POLYCYTHEMIA VERA, ACQUIRED (HCC): ICD-10-CM

## 2019-12-20 PROCEDURE — 99214 OFFICE O/P EST MOD 30 MIN: CPT | Performed by: FAMILY MEDICINE

## 2019-12-20 RX ORDER — VARENICLINE TARTRATE 1 MG/1
1 TABLET, FILM COATED ORAL 2 TIMES DAILY
Qty: 60 TABLET | Refills: 1 | Status: SHIPPED | OUTPATIENT
Start: 2019-12-20 | End: 2020-02-24 | Stop reason: SDDI

## 2019-12-20 RX ORDER — DULOXETIN HYDROCHLORIDE 30 MG/1
30 CAPSULE, DELAYED RELEASE ORAL DAILY
Qty: 90 CAPSULE | Refills: 0 | Status: SHIPPED | OUTPATIENT
Start: 2019-12-20 | End: 2020-02-24 | Stop reason: SDUPTHER

## 2020-01-10 RX ORDER — VORTIOXETINE 10 MG/1
TABLET, FILM COATED ORAL
Qty: 30 TABLET | Refills: 0 | OUTPATIENT
Start: 2020-01-10

## 2020-01-13 ENCOUNTER — OFFICE VISIT (OUTPATIENT)
Dept: UROLOGY | Age: 40
End: 2020-01-13
Payer: COMMERCIAL

## 2020-01-13 VITALS
HEART RATE: 73 BPM | TEMPERATURE: 96.8 F | SYSTOLIC BLOOD PRESSURE: 135 MMHG | DIASTOLIC BLOOD PRESSURE: 77 MMHG | BODY MASS INDEX: 27.36 KG/M2 | WEIGHT: 202 LBS | HEIGHT: 72 IN

## 2020-01-13 DIAGNOSIS — E29.1 HYPOGONADISM IN MALE: ICD-10-CM

## 2020-01-13 LAB
ESTRADIOL LEVEL: 25.5 PG/ML
HCT VFR BLD CALC: 54.3 % (ref 42–52)
HEMOGLOBIN: 18.1 G/DL (ref 14–18)
PROSTATE SPECIFIC ANTIGEN: 0.91 NG/ML (ref 0–4)

## 2020-01-13 PROCEDURE — 99202 OFFICE O/P NEW SF 15 MIN: CPT | Performed by: PHYSICIAN ASSISTANT

## 2020-01-13 RX ORDER — SILDENAFIL CITRATE 20 MG/1
20 TABLET ORAL DAILY PRN
Qty: 5 TABLET | Refills: 0 | Status: SHIPPED | OUTPATIENT
Start: 2020-01-13 | End: 2020-05-08 | Stop reason: SDUPTHER

## 2020-01-13 RX ORDER — DULOXETIN HYDROCHLORIDE 30 MG/1
CAPSULE, DELAYED RELEASE ORAL
COMMUNITY
Start: 2020-01-01

## 2020-01-13 ASSESSMENT — ENCOUNTER SYMPTOMS
SINUS PAIN: 0
VOMITING: 0
WHEEZING: 0
SHORTNESS OF BREATH: 0
EYE PAIN: 0
BACK PAIN: 0
ABDOMINAL PAIN: 0

## 2020-01-13 NOTE — PROGRESS NOTES
hematuria and urgency. Musculoskeletal: Negative for back pain and myalgias. Skin: Negative for rash. Allergic/Immunologic: Negative for environmental allergies. Neurological: Negative for tremors. Psychiatric/Behavioral: Negative for hallucinations. The patient is not nervous/anxious. PHYSICAL EXAM:  /77   Pulse 73   Temp 96.8 °F (36 °C) (Temporal)   Ht 6' (1.829 m)   Wt 202 lb (91.6 kg)   BMI 27.40 kg/m²   Physical Exam  Constitutional:       Appearance: Normal appearance. He is normal weight. HENT:      Head: Normocephalic. Nose: Nose normal.   Eyes:      Conjunctiva/sclera: Conjunctivae normal.   Pulmonary:      Effort: Pulmonary effort is normal.   Abdominal:      General: Abdomen is flat. Palpations: Abdomen is soft. Tenderness: There is no tenderness. Genitourinary:     Penis: Normal.       Scrotum/Testes: Normal.      Prostate: Not enlarged, not tender and no nodules present. Skin:     General: Skin is warm and dry. Neurological:      Mental Status: He is alert and oriented to person, place, and time. Psychiatric:         Mood and Affect: Mood normal.         Behavior: Behavior normal.                   1. Hypogonadism in male  Patient who has been diagnosed with low testosterone or hypogonadism for several years he is currently on IM testosterone he does not feel it gives him the improvement in symptoms that he would like he has never been on any other testosterone replacement we did discuss testosterone gel and Testopel. Labs today. - Testosterone Free and Total Male; Future  - Hemoglobin and Hematocrit, Blood; Future  - PSA, Diagnostic; Future  - Estradiol; Future    2.  Erectile dysfunction, unspecified erectile dysfunction type  I am going to try him on 20 mg sildenafil I explained how to take the medication I also explained possible side effects to include nasal stuffiness frontal headache facial flushing and should he get an erection lasting up to 4 hours he needs to come to the nearest emergency department preferably [facility]. Orders Placed This Encounter   Procedures    Testosterone Free and Total Male     Standing Status:   Future     Number of Occurrences:   1     Standing Expiration Date:   1/13/2021    Hemoglobin and Hematocrit, Blood     Standing Status:   Future     Number of Occurrences:   1     Standing Expiration Date:   1/13/2021    PSA, Diagnostic     Standing Status:   Future     Number of Occurrences:   1     Standing Expiration Date:   1/13/2021    Estradiol     Standing Status:   Future     Number of Occurrences:   1     Standing Expiration Date:   1/13/2021     Orders Placed This Encounter   Medications    sildenafil (REVATIO) 20 MG tablet     Sig: Take 1 tablet by mouth daily as needed (prior to sexual activity)     Dispense:  5 tablet     Refill:  0       Plan:  Follow-up in 2 weeks with labs.

## 2020-01-13 NOTE — PROGRESS NOTES
[facility]  [address]  [facility] [address] [phone]    Patient Name: Luis Garcia Jr.  Encounter Date: 01/16/2020  YOB: 1980  Patient Number: [id number]    Initial Note    REASON FOR CONSULTATION: Leukocytosis/polycythemia    HISTORY OF PRESENT ILLNESS: Luis Garcia Jr. is a 39-year-old male, medical patient of Dr. Keely Rizo whose history includes tobacco abuse, anxiety, depression, hypertension, chronic back pain with thoracic spondylosis, hypogonadism on replacement therapy with chorionic gonadotropin (approximately for the last year and a half).    10/24/2019-was seen in Dr. Rizo's office with chronic fatigue in spite of twice weekly testosterone injections.  At the time was noted to have a hemoglobin of 18.3, hematocrit 24.5, MCV 87.2, WBC 10.03, and platelets of 251,000.  Was asked to cut back the testosterone to once weekly.    Review of available labs and radiographs:    08/03/2018-hemoglobin 15.5, hematocrit 45.2, MCV 82.9, WBC 7.3, platelets 213,000.  Accompanying CMP normal.    02/22/2019- hemoglobin 17.9, hematocrit 53.3, MCV 86.7, WBC 11.64, ANC 8.87, otherwise normal differential.  Platelets 327,000.  Accompanying CMP normal.    10/17/2019-hemoglobin 19.3, hematocrit 58.7, MCV 89.1, platelets 222,000, WBC 7.87 with a normal differential.  Accompanying CMP normal.    10/17/2019- x-ray thoracic spine.  Impression: Thoracic spine scoliosis with mild spondylosis.  No acute osseous abnormalities.    10/22/2019- hemoglobin 18.3, hematocrit 54.5, MCV 87.2, WBC 10.03 with a normal differential, platelets 251,000.  Accompanying CMP normal.    10/22/2019-chest x-ray.  Impression: No acute appearing infiltrate.  Mild bronchial wall thickening, stable.    10/22/2019-CT head without contrast.  Impression: No hemorrhage, edema or mass-effect.  No acute findings.    10/24/2019-MRI thoracic  spine without contrast.  Impression: Thoracic spine scoliosis with mild spondylosis.  Otherwise negative thoracic spine MRI imaging.    11/25/2019-hemoglobin 11.6, hematocrit 57.1, MCV 88.3, WBC 6.98, platelets 258,000.    The patient is seen regarding the erythrocytosis/polycythemia.    LABS    Lab Results - Last 18 Months   Lab Units 01/13/20  1444 11/25/19  0947 10/22/19  1739 10/17/19  1115 02/22/19  1345 08/03/18  0128   HEMOGLOBIN g/dL 18.1* 18.6* 18.3* 19.3* 17.9 15.5   HEMATOCRIT % 54.3* 57.1* 54.5* 58.7* 53.3* 45.2   MCV fL  --  88.3 87.2 89.1 86.7 82.9   WBC 10*3/mm3  --  6.98 10.03 7.87 11.64* 7.30   RDW %  --  13.6 13.4 14.2 13.3 12.9   MPV fL  --   --  11.4  --  11.4 11.7   PLATELETS 10*3/mm3  --  258 251 222 327 213   IMM GRAN % %  --   --  0.2  --  0.3 0.4   NEUTROS ABS 10*3/mm3  --  4.59 7.86* 5.12 8.87* 5.00   LYMPHS ABS 10*3/mm3  --  1.75 1.53 2.06 2.07 1.90   MONOS ABS 10*3/mm3  --  0.48 0.52 0.46 0.52 0.31   EOS ABS 10*3/mm3  --  0.11 0.05 0.16 0.09 0.03   BASOS ABS 10*3/mm3  --  0.04 0.05 0.06 0.05 0.03   IMMATURE GRANS (ABS) 10*3/mm3  --   --  0.02  --  0.04 0.03   NRBC /100 WBC  --  0.0 0.0 0.0 0.0 0.0       Lab Results - Last 18 Months   Lab Units 10/22/19  1739 10/17/19  1115 02/22/19  1345 08/03/18  0128   GLUCOSE mg/dL 93  --  108* 107*   SODIUM mmol/L 139 138 138 143   POTASSIUM mmol/L 4.1 5.1 4.2 4.2   TOTAL CO2 mmol/L  --  25.8  --   --    CO2 mmol/L 27.0  --  27.0 29.0   CHLORIDE mmol/L 99 98 103 103   ANION GAP mmol/L 13.0  --  8.0 11.0   CREATININE mg/dL 1.22 1.23 0.91 0.95   BUN mg/dL 12 13 16 13   BUN / CREAT RATIO  9.8 10.6 17.6 13.7   CALCIUM mg/dL 9.5 10.0 9.5 9.6   EGFR IF NONAFRICN AM mL/min/1.73 67 66 94 90   ALK PHOS U/L 56 56 67 61   TOTAL PROTEIN g/dL 7.8  --  7.9 7.2   ALT (SGPT) U/L 23 30 36 36   AST (SGOT) U/L 19 19 33 24   BILIRUBIN mg/dL 0.9 0.8 0.9 0.9   ALBUMIN g/dL 4.70 4.90 4.70 4.30   GLOBULIN gm/dL 3.1  --  3.2 2.9         Lab Results - Last 18 Months   Lab  Units 10/17/19  1115   TSH uIU/mL 1.980         PAST MEDICAL HISTORY:  ALLERGIES:  Allergies   Allergen Reactions   • Shrimp Provider Review Needed     Pt unsure     • Topamax [Topiramate] Unknown (See Comments)     Patient states he has blurry vision   • Toradol [Ketorolac Tromethamine] Palpitations     CURRENT MEDICATIONS:  Outpatient Encounter Medications as of 1/16/2020   Medication Sig Dispense Refill   • DULoxetine (CYMBALTA) 30 MG capsule Take 1 capsule by mouth Daily. 90 capsule 0   • fexofenadine (ALLEGRA) 180 MG tablet Take 1 tablet by mouth Daily. 30 tablet 2   • HYDROcodone-acetaminophen (NORCO) 5-325 MG per tablet Take 1 tablet by mouth Every 6 (Six) Hours As Needed.     • TESTOSTERONE CYPIONATE IJ Inject  as directed 2 (Two) Times a Week.     • varenicline (CHANTIX STARTING MONTH NIKO) 0.5 MG X 11 & 1 MG X 42 tablet Take 0.5 mg one daily on days 1-3 and and 0.5 mg twice daily on days 4-7.Then 1 mg twice daily for a total of 12 weeks. 53 tablet 0   • Chorionic Gonadotropin 02537 units reconstituted solution Inject  as directed 2 (Two) Times a Week. Mix and give 0.125ml or 12.5u     • nicotine (NICODERM CQ) 14 MG/24HR patch Place 1 patch on the skin as directed by provider Daily. 21 patch 0   • nicotine (NICODERM CQ) 21 MG/24HR patch Place 1 patch on the skin as directed by provider Daily. 21 patch 0   • nicotine (NICODERM CQ) 7 MG/24HR patch Place 1 patch on the skin as directed by provider Daily. 21 patch 0   • triamcinolone (KENALOG) 0.1 % paste Apply to tongue 1-2 times daily 5 g 3   • varenicline (CHANTIX CONTINUING MONTH NIKO) 1 MG tablet Take 1 tablet by mouth 2 (Two) Times a Day. 60 tablet 1     No facility-administered encounter medications on file as of 1/16/2020.      Adult illnesses  Hypertension  Obesity  Hypogonadism on testosterone replacement therapy  Chronic fatigue  Anxiety  Depression  Tobacco abuse  Varicocele  Spermatocele  Testalgia  Erythrocytosis/secondary polycythemia  Lumbar  herniated disc with chronic back pain  Cervical herniated disc  Thoracic spine scoliosis with mild spondylosis, thoracic MRI 10/24/2019  Tongue leukoplakia  History of Lyme disease  History of [place] spotted fever    Past surgeries:  Ankle surgery  Femur surgery  Mandibular fracture surgery    ADULT ILLNESSES:  Patient Active Problem List   Diagnosis Code   • Varicocele I86.1   • Spermatocele N43.40   • Testalgia N50.819   • Erythrocytosis D75.1   • Essential hypertension I10   • Overweight (BMI 25.0-29.9) E66.3   • Chronic fatigue R53.82   • Hypogonadism in male E29.1   • Chronic midline thoracic back pain M54.6, G89.29   • Lumbar radiculopathy M54.16   • Lumbar spine pain M54.5   • Scoliosis (and kyphoscoliosis), idiopathic M41.20     SURGERIES:  Past Surgical History:   Procedure Laterality Date   • ANKLE SURGERY     • FEMUR SURGERY     • MANDIBLE FRACTURE SURGERY       HEALTH MAINTENANCE ITEMS:  Health Maintenance Due   Topic Date Due   • ANNUAL PHYSICAL  02/08/1985   • TDAP/TD VACCINES (1 - Tdap) 02/08/1993   • INFLUENZA VACCINE  08/01/2019       <no information>  Last Completed Colonoscopy     Patient has no health maintenance due at this time          There is no immunization history on file for this patient.  Last Completed Mammogram     Patient has no health maintenance due at this time            FAMILY HISTORY:  Family History   Problem Relation Age of Onset   • Heart disease Paternal Grandmother    • Anxiety disorder Mother    • Hypertension Father    • Cancer Maternal Grandmother      SOCIAL HISTORY:  Social History     Socioeconomic History   • Marital status:      Spouse name: Not on file   • Number of children: Not on file   • Years of education: Not on file   • Highest education level: Not on file   Tobacco Use   • Smoking status: Former Smoker   • Smokeless tobacco: Current User     Types: Chew     Last attempt to quit: 11/1/2018   Substance and Sexual Activity   • Alcohol use: Yes  "    Comment: Social    • Drug use: No   • Sexual activity: Defer     Partners: Female       REVIEW OF SYSTEMS:  Review of Systems   Constitutional: Positive for activity change (chronic low energy) and fatigue. Negative for appetite change, chills, diaphoresis, fever, unexpected weight gain and unexpected weight loss.   HENT: Positive for postnasal drip, rhinorrhea and sinus pressure.    Eyes: Negative.    Respiratory: Positive for cough (mostly dry) and shortness of breath (baseline exertional dyspnea and is sometimes short of breath with his routine activities). Negative for chest tightness.    Cardiovascular: Negative.    Gastrointestinal: Negative.    Endocrine: Negative.    Genitourinary: Negative.    Musculoskeletal: Positive for arthralgias and back pain.   Skin: Negative.    Allergic/Immunologic: Positive for environmental allergies (perennial sinus symptoms). Negative for food allergies.   Neurological: Positive for dizziness (postural) and headache.   Hematological: Negative.    Psychiatric/Behavioral: Positive for depressed mood. The patient is nervous/anxious.        /82   Pulse 68   Temp 97.8 °F (36.6 °C)   Resp 16   Ht 181.6 cm (71.5\")   Wt 94.2 kg (207 lb 11.2 oz)   SpO2 95%   BMI 28.56 kg/m²  Body surface area is 2.15 meters squared.  Pain Score    01/16/20 1544   PainSc:   5       Physical Exam:  Physical Exam   Constitutional: He is oriented to person, place, and time. He appears well-developed and well-nourished. No distress.   Pleasant, cooperative, modestly kept male.  Ambulatory.     HENT:   Head: Normocephalic and atraumatic.   Mouth/Throat: No oropharyngeal exudate.   Eyes: Pupils are equal, round, and reactive to light. Conjunctivae and EOM are normal. No scleral icterus.   Neck: Normal range of motion. Neck supple. No JVD present. No tracheal deviation present. No thyromegaly present.   Cardiovascular: Normal rate, regular rhythm and normal heart sounds. Exam reveals no " friction rub.   No murmur heard.  Pulmonary/Chest: Effort normal and breath sounds normal. No stridor. No respiratory distress. He has no wheezes. He has no rales.   Abdominal: Soft. Bowel sounds are normal. He exhibits no distension and no mass. There is no tenderness. There is no rebound and no guarding.   Musculoskeletal: Normal range of motion. He exhibits no edema, tenderness or deformity.   Lymphadenopathy:     He has no cervical adenopathy.   Neurological: He is alert and oriented to person, place, and time. No cranial nerve deficit.   Skin: Skin is warm and dry. No rash noted. No erythema.   Psychiatric: He has a normal mood and affect. His behavior is normal. Judgment and thought content normal.   Vitals reviewed.      ASSESSMENT:        1.  Erythrocytosis/polycythemia.  Likely secondary since no other features suggestive of polycythemia vera.           a.   Platelets less than 400,000.           b.   White blood cell count less than 12,000.           c.   No splenomegaly.           d.   No history of deep venous thrombosis, cerebrovascular accident (CVA)/transient ischemic attack (TIA), or other thrombotic complications.           e.   No erythromelalgia.            f.  Androgen agonist (chorionic gonadotropin) use          2.   Tobacco use (smokeless).        3.   Hypogonadism on testosterone replacement therapy        4.   Chronic fatigue        5.   Anxiety        6.   Depression        7.   Lumbar herniated disc with chronic back pain        8.   Cervical herniated disc        9.   Thoracic spine scoliosis with mild spondylosis, thoracic MRI 10/24/2019         RECOMMENDATIONS:        1.    Re: The patient is apprised of current heme and other labs.          2.    Re: Polycythemia. I explained that other than the HGB greater than 18.5, he has no other features that suggest polycythemia vera (see above).  Suspect replacement testosterone + tobacco excess as the underlying culprits.  Smoking  cessation is discussed.  He has been on Chantix.        3.   Draw CBC with differential, serum iron, iron saturation, ferritin CMP, EPO level, Andres 2 mutation, BCR/ABL1        4.   Sent to [facility] for room air ABG with carboxyhemoglobin        5.   CBC with differential every week till next office visit, with phlebotomy 250 mL if HGB greater than 16 and/or HCT greater than 48        6.   Rx: Aspirin 81 mg p.o. daily dispense 30×2 refills        7.   Wean off chorionic gonadotropin/testosterone is advised.  Will defer to Dr. Rizo.        8.   Continue ongoing management per primary care physician and other specialists.        9.   Return to the [facility] office with preoffice serum iron, iron saturation, ferritin CMP and CBC with differential in 6 weeks    MEDICAL DECISION MAKING: High complexity   AMOUNT OF DATA: Extensive     TIME SPENT: Face-to-face time on this encounter, as defined by the American Medical Association in the 2020 Current Procedural Terminology codebook; assessment, lab review, planning and education - at least 60 minutes (greater than 50% face-to-face).

## 2020-01-16 ENCOUNTER — CONSULT (OUTPATIENT)
Dept: ONCOLOGY | Facility: CLINIC | Age: 40
End: 2020-01-16

## 2020-01-16 ENCOUNTER — RESULTS ENCOUNTER (OUTPATIENT)
Dept: ONCOLOGY | Facility: CLINIC | Age: 40
End: 2020-01-16

## 2020-01-16 VITALS
HEIGHT: 72 IN | TEMPERATURE: 97.8 F | RESPIRATION RATE: 16 BRPM | BODY MASS INDEX: 28.13 KG/M2 | HEART RATE: 68 BPM | OXYGEN SATURATION: 95 % | SYSTOLIC BLOOD PRESSURE: 134 MMHG | DIASTOLIC BLOOD PRESSURE: 82 MMHG | WEIGHT: 207.7 LBS

## 2020-01-16 DIAGNOSIS — D75.1 ERYTHROCYTOSIS: ICD-10-CM

## 2020-01-16 DIAGNOSIS — E29.1 HYPOGONADISM IN MALE: ICD-10-CM

## 2020-01-16 DIAGNOSIS — E29.1 HYPOGONADISM IN MALE: Primary | ICD-10-CM

## 2020-01-16 PROBLEM — M41.20 SCOLIOSIS (AND KYPHOSCOLIOSIS), IDIOPATHIC: Status: ACTIVE | Noted: 2019-11-11

## 2020-01-16 PROCEDURE — 99205 OFFICE O/P NEW HI 60 MIN: CPT | Performed by: INTERNAL MEDICINE

## 2020-01-16 RX ORDER — HYDROCODONE BITARTRATE AND ACETAMINOPHEN 5; 325 MG/1; MG/1
1 TABLET ORAL EVERY 6 HOURS PRN
COMMUNITY
End: 2020-02-24

## 2020-01-17 ENCOUNTER — TELEPHONE (OUTPATIENT)
Dept: ONCOLOGY | Facility: CLINIC | Age: 40
End: 2020-01-17

## 2020-01-17 LAB
SEX HORMONE BINDING GLOBULIN: 18 NMOL/L (ref 11–80)
TESTOSTERONE FREE-NONMALE: 92.6 PG/ML (ref 47–244)
TESTOSTERONE TOTAL: 343 NG/DL (ref 220–1000)

## 2020-01-17 NOTE — TELEPHONE ENCOUNTER
Spoke with patient and let him know that Dr. Rizo's office can have labs drawn there weekly. If he were to need a phlebotomy then we would need to get him scheduled to come in here.

## 2020-02-03 ENCOUNTER — TELEPHONE (OUTPATIENT)
Dept: FAMILY MEDICINE CLINIC | Facility: CLINIC | Age: 40
End: 2020-02-03

## 2020-02-03 DIAGNOSIS — F41.9 ANXIETY AND DEPRESSION: ICD-10-CM

## 2020-02-03 DIAGNOSIS — F32.A ANXIETY AND DEPRESSION: ICD-10-CM

## 2020-02-03 RX ORDER — DULOXETIN HYDROCHLORIDE 30 MG/1
30 CAPSULE, DELAYED RELEASE ORAL DAILY
Qty: 90 CAPSULE | Refills: 0 | Status: CANCELLED | OUTPATIENT
Start: 2020-02-03

## 2020-02-03 NOTE — TELEPHONE ENCOUNTER
PTs girlfriend called to ask about a refill on pts medication DULoxetine (CYMBALTA) 30 MG capsule. Pt has been out for two days. Was under the impression it was set to auto refill but found out recently that it was not.         Pt can be contacted for clarification at [phone].

## 2020-02-06 ENCOUNTER — OFFICE VISIT (OUTPATIENT)
Dept: UROLOGY | Age: 40
End: 2020-02-06
Payer: COMMERCIAL

## 2020-02-06 VITALS — TEMPERATURE: 96.8 F | WEIGHT: 202 LBS | HEIGHT: 72 IN | BODY MASS INDEX: 27.36 KG/M2

## 2020-02-06 LAB
APPEARANCE FLUID: CLEAR
BILIRUBIN, POC: NORMAL
BLOOD URINE, POC: NORMAL
CLARITY, POC: CLEAR
COLOR, POC: YELLOW
GLUCOSE URINE, POC: NORMAL
KETONES, POC: NORMAL
LEUKOCYTE EST, POC: NORMAL
NITRITE, POC: NORMAL
PH, POC: 5
PROTEIN, POC: NORMAL
SPECIFIC GRAVITY, POC: 1.02
UROBILINOGEN, POC: 0.2

## 2020-02-06 PROCEDURE — 99213 OFFICE O/P EST LOW 20 MIN: CPT | Performed by: PHYSICIAN ASSISTANT

## 2020-02-06 PROCEDURE — 81002 URINALYSIS NONAUTO W/O SCOPE: CPT | Performed by: PHYSICIAN ASSISTANT

## 2020-02-06 RX ORDER — VARENICLINE TARTRATE 1 MG/1
TABLET, FILM COATED ORAL
COMMUNITY
Start: 2020-01-16 | End: 2020-09-08

## 2020-02-06 ASSESSMENT — ENCOUNTER SYMPTOMS
EYE REDNESS: 0
SHORTNESS OF BREATH: 0
COUGH: 0
BACK PAIN: 0
WHEEZING: 0
ABDOMINAL PAIN: 0
EYE DISCHARGE: 0
DIARRHEA: 0
CONSTIPATION: 0

## 2020-02-06 NOTE — PROGRESS NOTES
Allergic/Immunologic: Negative for environmental allergies. Neurological: Negative for dizziness, weakness and headaches. Hematological: Does not bruise/bleed easily. Psychiatric/Behavioral: Negative for behavioral problems, confusion and sleep disturbance. I have reviewed and agree with the documentation provided by the medical assistant on this patient. PHYSICAL EXAM:  Temp 96.8 °F (36 °C) (Temporal)   Ht 6' (1.829 m)   Wt 202 lb (91.6 kg)   BMI 27.40 kg/m²   Physical Exam  Vitals signs and nursing note reviewed. Constitutional:       General: He is in acute distress. Appearance: Normal appearance. HENT:      Nose: Nose normal.   Eyes:      Conjunctiva/sclera: Conjunctivae normal.   Neck:      Musculoskeletal: Normal range of motion. Pulmonary:      Effort: Pulmonary effort is normal.   Neurological:      Mental Status: He is alert and oriented to person, place, and time. Psychiatric:         Mood and Affect: Mood normal.         Behavior: Behavior normal.             DATA:  U/A:    Lab Results   Component Value Date    NITRITE neg 02/06/2020    COLORU yellow 02/06/2020    PROTEINU neg 02/06/2020    PHUR 5.0 02/06/2020    CLARITYU clear 02/06/2020    SPECGRAV 1.025 02/06/2020    LEUKOCYTESUR neg 02/06/2020    BILIRUBINUR neg 02/06/2020    BLOODU neg 02/06/2020    GLUCOSEU neg 02/06/2020             1. Hypogonadism in male  His testosterone level is 345 free testosterone is 92 PSA is normal hemoglobin hematocrit are elevated he does see oncology for this and has follow-up in 6 weeks. We discussed other treatment options I feel the testosterone gel would be a better option for him I will fax prescription to [facility] for the 200 mg testosterone gel 1 pump daily. Follow-up in 3 months.  - POCT Urinalysis no Micro  - Hemoglobin and Hematocrit, Blood;  Future  - Testosterone Free and Total Male; Future      Orders Placed This Encounter   Procedures    Hemoglobin and Hematocrit,

## 2020-02-10 RX ORDER — FEXOFENADINE HCL 180 MG/1
TABLET ORAL
Qty: 30 TABLET | Refills: 2 | Status: SHIPPED | OUTPATIENT
Start: 2020-02-10 | End: 2021-01-31 | Stop reason: SDUPTHER

## 2020-02-24 ENCOUNTER — OFFICE VISIT (OUTPATIENT)
Dept: FAMILY MEDICINE CLINIC | Facility: CLINIC | Age: 40
End: 2020-02-24

## 2020-02-24 VITALS
TEMPERATURE: 98.5 F | BODY MASS INDEX: 27.66 KG/M2 | WEIGHT: 204.2 LBS | DIASTOLIC BLOOD PRESSURE: 76 MMHG | HEART RATE: 85 BPM | HEIGHT: 72 IN | SYSTOLIC BLOOD PRESSURE: 116 MMHG | OXYGEN SATURATION: 98 % | RESPIRATION RATE: 20 BRPM

## 2020-02-24 DIAGNOSIS — E66.3 OVERWEIGHT (BMI 25.0-29.9): ICD-10-CM

## 2020-02-24 DIAGNOSIS — F41.9 ANXIETY AND DEPRESSION: Primary | ICD-10-CM

## 2020-02-24 DIAGNOSIS — F32.A ANXIETY AND DEPRESSION: Primary | ICD-10-CM

## 2020-02-24 PROBLEM — D72.829 LEUKOCYTOSIS: Status: ACTIVE | Noted: 2020-02-24

## 2020-02-24 PROCEDURE — 99213 OFFICE O/P EST LOW 20 MIN: CPT | Performed by: FAMILY MEDICINE

## 2020-02-24 RX ORDER — DULOXETIN HYDROCHLORIDE 30 MG/1
30 CAPSULE, DELAYED RELEASE ORAL DAILY
Qty: 90 CAPSULE | Refills: 1 | Status: SHIPPED | OUTPATIENT
Start: 2020-02-24 | End: 2020-05-01

## 2020-02-25 ENCOUNTER — TELEPHONE (OUTPATIENT)
Dept: ONCOLOGY | Facility: CLINIC | Age: 40
End: 2020-02-25

## 2020-02-25 NOTE — TELEPHONE ENCOUNTER
Left message for patient to call back. I am needing to see if he has had his labs drawn. If not he will need to go and have labs drawn.

## 2020-02-27 NOTE — TELEPHONE ENCOUNTER
Left message for patient to call back. I am needing to see if nico has had labs drawn for his appt tomorrow.

## 2020-04-29 ENCOUNTER — TELEPHONE (OUTPATIENT)
Dept: FAMILY MEDICINE CLINIC | Facility: CLINIC | Age: 40
End: 2020-04-29

## 2020-04-29 NOTE — TELEPHONE ENCOUNTER
Patient's tommy Brandt called and requested a callback to discuss the pt's Cymbalta Rx.    She says that he is experiencing symptoms that makes them wonder if he should get the dosage increased.    She has requested to be called back at [phone].

## 2020-04-30 NOTE — TELEPHONE ENCOUNTER
Patient girlfriend called reporting patient needs medication increased. Reports patient is very irritable and has no energy. Asking for increase on Cymbalta. Please review and advise.

## 2020-05-01 RX ORDER — DULOXETIN HYDROCHLORIDE 60 MG/1
60 CAPSULE, DELAYED RELEASE ORAL DAILY
Qty: 90 CAPSULE | Refills: 0 | Status: SHIPPED | OUTPATIENT
Start: 2020-05-01 | End: 2020-05-05 | Stop reason: ALTCHOICE

## 2020-05-04 ENCOUNTER — TELEPHONE (OUTPATIENT)
Dept: UROLOGY | Age: 40
End: 2020-05-04

## 2020-05-05 ENCOUNTER — TELEPHONE (OUTPATIENT)
Dept: FAMILY MEDICINE CLINIC | Facility: CLINIC | Age: 40
End: 2020-05-05

## 2020-05-05 DIAGNOSIS — E29.1 HYPOGONADISM IN MALE: ICD-10-CM

## 2020-05-05 LAB
HCT VFR BLD CALC: 50.5 % (ref 42–52)
HEMOGLOBIN: 16.7 G/DL (ref 14–18)

## 2020-05-05 RX ORDER — DULOXETIN HYDROCHLORIDE 30 MG/1
30 CAPSULE, DELAYED RELEASE ORAL DAILY
Qty: 30 CAPSULE | Refills: 0 | Status: CANCELLED | OUTPATIENT
Start: 2020-05-05

## 2020-05-05 RX ORDER — DULOXETIN HYDROCHLORIDE 30 MG/1
30 CAPSULE, DELAYED RELEASE ORAL DAILY
Qty: 90 CAPSULE | Refills: 0 | Status: SHIPPED | OUTPATIENT
Start: 2020-05-05 | End: 2020-10-09 | Stop reason: SDUPTHER

## 2020-05-05 NOTE — TELEPHONE ENCOUNTER
----- Message from Luis Garcia Jr. sent at 5/5/2020 10:28 AM CDT -----  Regarding: Non-Urgent Medical Question  Contact: [phone]  After taking my cymbalta with the new dose yesterday I broke out into hives and they are still present today, with mild itching in various places.     Is this a possible allergic reaction?

## 2020-05-05 NOTE — TELEPHONE ENCOUNTER
It is possible however he has tolerated the 30MG without difficulty in the past. Please confirm no respiratory symptoms. Also see if there was anything else new that could explain the reaction.  I would decrease to 30MG dose, take benadryl. When resolves if want to try the higher dose one more time to see if you have another reaction we can to rule in or out it as the source of reaction - I would keep benadryl on hand.

## 2020-05-08 ENCOUNTER — OFFICE VISIT (OUTPATIENT)
Dept: FAMILY MEDICINE CLINIC | Facility: CLINIC | Age: 40
End: 2020-05-08

## 2020-05-08 VITALS
TEMPERATURE: 98 F | DIASTOLIC BLOOD PRESSURE: 84 MMHG | OXYGEN SATURATION: 98 % | SYSTOLIC BLOOD PRESSURE: 122 MMHG | BODY MASS INDEX: 28.36 KG/M2 | RESPIRATION RATE: 18 BRPM | HEART RATE: 64 BPM | WEIGHT: 202.6 LBS | HEIGHT: 71 IN

## 2020-05-08 DIAGNOSIS — B80 PINWORM INFECTION: Primary | ICD-10-CM

## 2020-05-08 PROCEDURE — 99213 OFFICE O/P EST LOW 20 MIN: CPT | Performed by: FAMILY MEDICINE

## 2020-05-08 RX ORDER — ALBENDAZOLE 200 MG/1
400 TABLET, FILM COATED ORAL ONCE
Qty: 2 TABLET | Refills: 0 | Status: SHIPPED | OUTPATIENT
Start: 2020-05-08 | End: 2020-05-08

## 2020-05-08 RX ORDER — SILDENAFIL CITRATE 20 MG/1
20 TABLET ORAL DAILY PRN
Qty: 5 TABLET | Refills: 5 | Status: SHIPPED | OUTPATIENT
Start: 2020-05-08 | End: 2020-07-14 | Stop reason: SDUPTHER

## 2020-05-08 RX ORDER — SILDENAFIL CITRATE 20 MG/1
TABLET ORAL
COMMUNITY
Start: 2020-02-26 | End: 2020-12-04 | Stop reason: ALTCHOICE

## 2020-05-09 LAB
SEX HORMONE BINDING GLOBULIN: 18 NMOL/L (ref 11–80)
TESTOSTERONE FREE-NONMALE: 115.2 PG/ML (ref 47–244)
TESTOSTERONE TOTAL: 418 NG/DL (ref 220–1000)

## 2020-07-14 ENCOUNTER — TELEPHONE (OUTPATIENT)
Dept: UROLOGY | Age: 40
End: 2020-07-14

## 2020-07-14 ENCOUNTER — PATIENT MESSAGE (OUTPATIENT)
Dept: UROLOGY | Age: 40
End: 2020-07-14

## 2020-07-14 RX ORDER — SILDENAFIL CITRATE 20 MG/1
20 TABLET ORAL DAILY PRN
Qty: 30 TABLET | Refills: 3 | Status: SHIPPED | OUTPATIENT
Start: 2020-07-14 | End: 2021-03-29

## 2020-08-25 LAB — HOLD SPECIMEN: NORMAL

## 2020-09-08 ENCOUNTER — OFFICE VISIT (OUTPATIENT)
Dept: UROLOGY | Age: 40
End: 2020-09-08
Payer: COMMERCIAL

## 2020-09-08 VITALS — TEMPERATURE: 98 F | WEIGHT: 197 LBS | HEIGHT: 72 IN | BODY MASS INDEX: 26.68 KG/M2

## 2020-09-08 PROBLEM — N52.9 ERECTILE DYSFUNCTION: Status: ACTIVE | Noted: 2020-09-08

## 2020-09-08 PROBLEM — E29.1 HYPOGONADISM IN MALE: Status: ACTIVE | Noted: 2020-09-08

## 2020-09-08 PROCEDURE — 99213 OFFICE O/P EST LOW 20 MIN: CPT | Performed by: PHYSICIAN ASSISTANT

## 2020-09-08 RX ORDER — TADALAFIL 20 MG/1
20 TABLET ORAL PRN
Qty: 30 TABLET | Refills: 5 | Status: SHIPPED | OUTPATIENT
Start: 2020-09-08 | End: 2020-09-15 | Stop reason: SDUPTHER

## 2020-09-08 ASSESSMENT — ENCOUNTER SYMPTOMS
DIARRHEA: 0
EYE REDNESS: 0
ABDOMINAL PAIN: 0
COUGH: 0
CONSTIPATION: 0
WHEEZING: 0
EYE DISCHARGE: 0
BACK PAIN: 0
SHORTNESS OF BREATH: 0

## 2020-09-08 NOTE — PROGRESS NOTES
Systems   Constitutional: Negative for chills and fever. HENT: Negative for congestion and hearing loss. Eyes: Negative for discharge and redness. Respiratory: Negative for cough, shortness of breath and wheezing. Cardiovascular: Negative for leg swelling. Gastrointestinal: Negative for abdominal pain, constipation and diarrhea. Endocrine: Negative for cold intolerance and heat intolerance. Genitourinary: Negative for decreased urine volume, difficulty urinating, dysuria, enuresis, flank pain, frequency, genital sores, hematuria and urgency. Musculoskeletal: Negative for back pain and gait problem. Skin: Negative for rash. Allergic/Immunologic: Negative for environmental allergies. Neurological: Negative for dizziness, weakness and headaches. Hematological: Does not bruise/bleed easily. Psychiatric/Behavioral: Negative for behavioral problems, confusion and sleep disturbance. I have reviewed and agree with the documentation provided by the medical assistant on this patient for today's visit. PHYSICAL EXAM:  Temp 98 °F (36.7 °C) (Temporal)   Ht 6' (1.829 m)   Wt 197 lb (89.4 kg)   BMI 26.72 kg/m²   Physical Exam  Vitals signs reviewed. Constitutional:       General: He is not in acute distress. Appearance: Normal appearance. He is not ill-appearing. HENT:      Head: Normocephalic and atraumatic. Right Ear: External ear normal.      Left Ear: External ear normal.      Nose: No congestion. Eyes:      General:         Right eye: No discharge. Left eye: No discharge. Neck:      Comments: No obvious neck masses  Pulmonary:      Effort: Pulmonary effort is normal.   Musculoskeletal:      Comments: Patient ambulates without difficulty   Skin:     Coloration: Skin is not pale. Comments: No obvious facial or upper extremity rash   Neurological:      General: No focal deficit present. Mental Status: He is alert and oriented to person, place, and time. Psychiatric:         Mood and Affect: Mood normal.         Behavior: Behavior normal.               1. Hypogonadism in male  Patient doing much better on daily testosterone gel he uses 200 mg 1 pump daily from [address] [facility]. Although his total testosterone has increased it is a modest increase however based on how he feels I would leave him at this dose. Total testosterone is currently 414 free testosterone was increased also at 115. Recommend 6-month follow-up with labs including PSA. - Testosterone Free and Total Male; Future  - Hemoglobin and Hematocrit, Blood; Future  - PSA, Diagnostic; Future    2. Erectile dysfunction, unspecified erectile dysfunction type  Patient had response to the sildenafil Viagra however he had tried samples of Cialis from another physician and stated he liked the quality of erection better with the Cialis 20 mg.  I went ahead and wrote prescription for Cialis. He does not take any nitroglycerin or any nitrates for chest pain. I did tell patient that this is a extended release form of medication and should not be taken on a daily basis      Orders Placed This Encounter   Procedures    Testosterone Free and Total Male     Standing Status:   Future     Standing Expiration Date:   9/8/2021    Hemoglobin and Hematocrit, Blood     Standing Status:   Future     Standing Expiration Date:   9/8/2021    PSA, Diagnostic     Standing Status:   Future     Standing Expiration Date:   9/8/2021     Orders Placed This Encounter   Medications    tadalafil (CIALIS) 20 MG tablet     Sig: Take 1 tablet by mouth as needed for Erectile Dysfunction     Dispense:  30 tablet     Refill:  5       Plan:  Follow-up in 6 months with labs prior.

## 2020-09-15 RX ORDER — TADALAFIL 20 MG/1
20 TABLET ORAL PRN
Qty: 30 TABLET | Refills: 5 | Status: SHIPPED | OUTPATIENT
Start: 2020-09-15 | End: 2021-03-29 | Stop reason: SDUPTHER

## 2020-09-15 NOTE — PROGRESS NOTES
This was sent to [address] on 9/8, pt req it be sent to [facility] in [place], so he can use discount coupon since it is not covered by insurance. I have pended the Cialis to [address] him to sign. Pt also sent message that [address] did not rec Testosterone script, I will let Kathi Portillo know so we can fax new script to [phone].  (No order in chart.)

## 2020-09-15 NOTE — TELEPHONE ENCOUNTER
[facility] paper script given to Royden Simmonds. Once signed by Royden Simmonds will fax to [phone] [facility].

## 2020-09-15 NOTE — Clinical Note
Please sign Cialis script. I will place [facility] script on your desk for you to sign so I can fax it.

## 2020-09-16 NOTE — TELEPHONE ENCOUNTER
Yee bowden called stating they had not received testosterone rx and patient was there waiting.  I phoned in testosterone cream 200mg once daily x5 refills

## 2020-10-09 ENCOUNTER — OFFICE VISIT (OUTPATIENT)
Dept: FAMILY MEDICINE CLINIC | Facility: CLINIC | Age: 40
End: 2020-10-09

## 2020-10-09 VITALS
RESPIRATION RATE: 18 BRPM | SYSTOLIC BLOOD PRESSURE: 126 MMHG | HEIGHT: 71 IN | BODY MASS INDEX: 26.96 KG/M2 | HEART RATE: 82 BPM | OXYGEN SATURATION: 96 % | WEIGHT: 192.6 LBS | DIASTOLIC BLOOD PRESSURE: 82 MMHG | TEMPERATURE: 98.2 F

## 2020-10-09 DIAGNOSIS — M77.11 LATERAL EPICONDYLITIS OF RIGHT ELBOW: Primary | ICD-10-CM

## 2020-10-09 PROCEDURE — 99213 OFFICE O/P EST LOW 20 MIN: CPT | Performed by: FAMILY MEDICINE

## 2020-10-09 PROCEDURE — 96372 THER/PROPH/DIAG INJ SC/IM: CPT | Performed by: FAMILY MEDICINE

## 2020-10-09 RX ORDER — DULOXETIN HYDROCHLORIDE 30 MG/1
30 CAPSULE, DELAYED RELEASE ORAL DAILY
Qty: 90 CAPSULE | Refills: 0 | Status: SHIPPED | OUTPATIENT
Start: 2020-10-09 | End: 2021-01-31 | Stop reason: SDUPTHER

## 2020-10-09 RX ORDER — DEXAMETHASONE SODIUM PHOSPHATE 10 MG/ML
10 INJECTION INTRAMUSCULAR; INTRAVENOUS ONCE
Status: COMPLETED | OUTPATIENT
Start: 2020-10-09 | End: 2020-10-09

## 2020-10-09 RX ADMIN — DEXAMETHASONE SODIUM PHOSPHATE 10 MG: 10 INJECTION INTRAMUSCULAR; INTRAVENOUS at 14:09

## 2020-10-09 NOTE — PROGRESS NOTES
"Subjective cc: elbow pain   Luis Garcia Jr. is a 40 y.o. male who presents with complaint of elbow pain.     Elbow Pain  This is a new problem. The current episode started more than 1 month ago (2 months). The problem occurs constantly. The problem has been unchanged. Associated symptoms include arthralgias, myalgias and weakness. Pertinent negatives include no fever, joint swelling or numbness. The symptoms are aggravated by exertion and bending. He has tried ice, rest, sleep and position changes for the symptoms. The treatment provided mild relief.        The following portions of the patient's history were reviewed and updated as appropriate: allergies, current medications, past family history, past medical history, past social history, past surgical history and problem list.        Review of Systems   Constitutional: Negative for fever.   Musculoskeletal: Positive for arthralgias and myalgias. Negative for joint swelling.   Neurological: Positive for weakness. Negative for numbness.   All other systems reviewed and are negative.      Objective   Blood pressure 126/82, pulse 82, temperature 98.2 °F (36.8 °C), temperature source Infrared, resp. rate 18, height 180.3 cm (71\"), weight 87.4 kg (192 lb 9.6 oz), SpO2 96 %.  Physical Exam  Constitutional:       General: He is not in acute distress.     Appearance: He is not toxic-appearing.   HENT:      Head: Normocephalic.      Right Ear: External ear normal.      Left Ear: External ear normal.      Nose: Nose normal.   Eyes:      General:         Right eye: No discharge.         Left eye: No discharge.      Extraocular Movements: Extraocular movements intact.      Conjunctiva/sclera: Conjunctivae normal.   Cardiovascular:      Rate and Rhythm: Normal rate.      Pulses: Normal pulses.   Pulmonary:      Effort: Pulmonary effort is normal. No respiratory distress.   Musculoskeletal:         General: Swelling and tenderness present.      Right elbow: He exhibits " decreased range of motion and swelling. He exhibits no effusion, no deformity and no laceration. Tenderness found. Lateral epicondyle tenderness noted.   Skin:     General: Skin is warm and dry.   Neurological:      Mental Status: He is alert and oriented to person, place, and time.   Psychiatric:         Mood and Affect: Mood normal.         Behavior: Behavior normal.         Thought Content: Thought content normal.         Judgment: Judgment normal.         Assessment/Plan   Problems Addressed this Visit     None      Visit Diagnoses     Lateral epicondylitis of right elbow    -  Primary    Relevant Medications    dexamethasone (DECADRON) injection 10 mg (Completed)    diclofenac (VOLTAREN) 50 MG EC tablet      Diagnoses       Codes Comments    Lateral epicondylitis of right elbow    -  Primary ICD-10-CM: M77.11  ICD-9-CM: 726.32         Advised on risks with medications   Return if not improving           This document has been electronically signed by Keely Rizo MD on October 9, 2020 15:31 CDT

## 2020-11-09 DIAGNOSIS — M77.11 LATERAL EPICONDYLITIS OF RIGHT ELBOW: ICD-10-CM

## 2020-12-04 ENCOUNTER — OFFICE VISIT (OUTPATIENT)
Dept: FAMILY MEDICINE CLINIC | Facility: CLINIC | Age: 40
End: 2020-12-04

## 2020-12-04 VITALS
WEIGHT: 198 LBS | RESPIRATION RATE: 18 BRPM | SYSTOLIC BLOOD PRESSURE: 124 MMHG | OXYGEN SATURATION: 99 % | HEART RATE: 79 BPM | HEIGHT: 71 IN | TEMPERATURE: 98.4 F | BODY MASS INDEX: 27.72 KG/M2 | DIASTOLIC BLOOD PRESSURE: 82 MMHG

## 2020-12-04 DIAGNOSIS — M77.11 LATERAL EPICONDYLITIS OF RIGHT ELBOW: ICD-10-CM

## 2020-12-04 PROCEDURE — 99213 OFFICE O/P EST LOW 20 MIN: CPT | Performed by: FAMILY MEDICINE

## 2020-12-04 PROCEDURE — 96372 THER/PROPH/DIAG INJ SC/IM: CPT | Performed by: FAMILY MEDICINE

## 2020-12-04 RX ORDER — TADALAFIL 20 MG/1
20 TABLET ORAL AS NEEDED
COMMUNITY
Start: 2020-11-13

## 2020-12-04 RX ORDER — DEXAMETHASONE SODIUM PHOSPHATE 10 MG/ML
10 INJECTION INTRAMUSCULAR; INTRAVENOUS ONCE
Status: COMPLETED | OUTPATIENT
Start: 2020-12-04 | End: 2020-12-04

## 2020-12-04 RX ADMIN — DEXAMETHASONE SODIUM PHOSPHATE 10 MG: 10 INJECTION INTRAMUSCULAR; INTRAVENOUS at 14:59

## 2020-12-04 NOTE — PROGRESS NOTES
"Subjective cc: bilateral elbow pain   Luis Garcia Jr. is a 40 y.o. male who presents for follow up on elbow pain - is chronic. Better when taking NSAID - needs refill.      History of Present Illness     The following portions of the patient's history were reviewed and updated as appropriate: allergies, current medications, past family history, past medical history, past social history, past surgical history and problem list.        Review of Systems   Musculoskeletal: Positive for arthralgias.   All other systems reviewed and are negative.      Objective   Blood pressure 124/82, pulse 79, temperature 98.4 °F (36.9 °C), temperature source Infrared, resp. rate 18, height 180.3 cm (71\"), weight 89.8 kg (198 lb), SpO2 99 %.  Physical Exam  Musculoskeletal:      Right elbow: He exhibits decreased range of motion. Tenderness found.      Left elbow: He exhibits decreased range of motion. Tenderness found.         Assessment/Plan   Problems Addressed this Visit     None      Visit Diagnoses     Lateral epicondylitis of right elbow        Relevant Medications    diclofenac (VOLTAREN) 50 MG EC tablet    dexamethasone (DECADRON) injection 10 mg (Start on 12/4/2020  2:46 PM)      Diagnoses       Codes Comments    Lateral epicondylitis of right elbow     ICD-10-CM: M77.11  ICD-9-CM: 726.32           Advised on risks and benefits of medicaiton   Advised on warning isgns   Return ifnot improving           This document has been electronically signed by Keely Rizo MD on December 17, 2020 17:47 CST           "

## 2021-02-01 RX ORDER — DULOXETIN HYDROCHLORIDE 30 MG/1
30 CAPSULE, DELAYED RELEASE ORAL DAILY
Qty: 90 CAPSULE | Refills: 0 | Status: SHIPPED | OUTPATIENT
Start: 2021-02-01

## 2021-02-01 RX ORDER — FEXOFENADINE HCL 180 MG/1
180 TABLET ORAL DAILY
Qty: 30 TABLET | Refills: 2 | Status: SHIPPED | OUTPATIENT
Start: 2021-02-01

## 2021-02-01 NOTE — TELEPHONE ENCOUNTER
Last OV 12.04.2020 - Elbow pain (seen for elbow pain on multiple occasions)     Last OV  2.24.2020   Missed appt for f/u on 8.24.2020    Last RX:  Fexofenadine 2.10.2020/ Qty 30/ 1 daily/ 2 refills  Duloxetine  10.09.2020/ Qty 90/ 1 daily

## 2021-03-05 ENCOUNTER — TELEPHONE (OUTPATIENT)
Dept: UROLOGY | Age: 41
End: 2021-03-05

## 2021-03-05 NOTE — TELEPHONE ENCOUNTER
Attempted to contact PT about required labs for Monday, 2/8 appt with Naty. He didn't answer, no VM. Left VM with Candice, g-friend, for him to call office about appt on Monday. If he calls, find out if he got the labs. If not, the appt has to be RE'ed. He has to get the lab in the AM.  Go out 3 weeks and RS. Tell him to get the labs Monday.  Thank You

## 2021-03-24 DIAGNOSIS — E29.1 HYPOGONADISM IN MALE: ICD-10-CM

## 2021-03-24 LAB
HCT VFR BLD CALC: 51.7 % (ref 42–52)
HEMOGLOBIN: 17.3 G/DL (ref 14–18)
PROSTATE SPECIFIC ANTIGEN: 0.62 NG/ML (ref 0–4)

## 2021-03-26 LAB
SEX HORMONE BINDING GLOBULIN: 22 NMOL/L (ref 11–80)
TESTOSTERONE FREE-NONMALE: 114.6 PG/ML (ref 47–244)
TESTOSTERONE TOTAL: 449 NG/DL (ref 220–1000)

## 2021-03-29 ENCOUNTER — OFFICE VISIT (OUTPATIENT)
Dept: UROLOGY | Age: 41
End: 2021-03-29
Payer: COMMERCIAL

## 2021-03-29 VITALS
WEIGHT: 193 LBS | SYSTOLIC BLOOD PRESSURE: 132 MMHG | BODY MASS INDEX: 27.02 KG/M2 | TEMPERATURE: 97 F | RESPIRATION RATE: 18 BRPM | HEIGHT: 71 IN | HEART RATE: 77 BPM | DIASTOLIC BLOOD PRESSURE: 84 MMHG

## 2021-03-29 DIAGNOSIS — N52.9 ERECTILE DYSFUNCTION, UNSPECIFIED ERECTILE DYSFUNCTION TYPE: ICD-10-CM

## 2021-03-29 DIAGNOSIS — E29.1 HYPOGONADISM IN MALE: Primary | ICD-10-CM

## 2021-03-29 LAB
BILIRUBIN, POC: NORMAL
BLOOD URINE, POC: NORMAL
CLARITY, POC: CLEAR
COLOR, POC: YELLOW
GLUCOSE URINE, POC: NORMAL
KETONES, POC: NORMAL
LEUKOCYTE EST, POC: NORMAL
NITRITE, POC: NORMAL
PH, POC: 5.5
PROTEIN, POC: NORMAL
SPECIFIC GRAVITY, POC: 1.03
UROBILINOGEN, POC: 0.2

## 2021-03-29 PROCEDURE — 99214 OFFICE O/P EST MOD 30 MIN: CPT | Performed by: NURSE PRACTITIONER

## 2021-03-29 PROCEDURE — 81003 URINALYSIS AUTO W/O SCOPE: CPT | Performed by: NURSE PRACTITIONER

## 2021-03-29 RX ORDER — TADALAFIL 20 MG/1
20 TABLET ORAL PRN
Qty: 30 TABLET | Refills: 5 | Status: SHIPPED | OUTPATIENT
Start: 2021-03-29

## 2021-03-29 ASSESSMENT — ENCOUNTER SYMPTOMS
ABDOMINAL PAIN: 0
VOMITING: 0
NAUSEA: 0
BACK PAIN: 0
ABDOMINAL DISTENTION: 0

## 2021-03-29 NOTE — PROGRESS NOTES
Suzette Garcia. is a 46 y.o., male, Established patient who presents today   Chief Complaint   Patient presents with    Follow-up     hypogonadism in male       HPI   Patient presents for follow-up of low testosterone. He had previously been maintained on IM injections, however, he was not having a good response and was switched to daily compounded testosterone gel, 200 mg 1 pump daily. He has a much better response to be daily gel and reports less fatigue, increased energy, increased libido. Current labs reveal testosterone 449, free testosterone 114.6, hemoglobin 17.3, hematocrit 51.7%, PSA 0.62. He does have some history of polycythemia. His hemoglobin and hematocrit are slightly higher than the levels I prefer (16 and 50%). Patient is also seen for erectile dysfunction. He is currently maintained on Cialis 20 mg. He had been on sildenafil prior but this was not as effective as Cialis. REVIEW OF SYSTEMS:  Review of Systems   Constitutional: Negative for chills and fever. Gastrointestinal: Negative for abdominal distention, abdominal pain, nausea and vomiting. Genitourinary: Negative for difficulty urinating, dysuria, flank pain, frequency, hematuria and urgency. Musculoskeletal: Negative for back pain and gait problem. Psychiatric/Behavioral: Negative for agitation and confusion. PHYSICAL EXAM:  /84   Pulse 77   Temp 97 °F (36.1 °C)   Resp 18   Ht 5' 11\" (1.803 m)   Wt 193 lb (87.5 kg)   BMI 26.92 kg/m²   Physical Exam  Vitals signs and nursing note reviewed. Constitutional:       General: He is not in acute distress. Appearance: Normal appearance. He is not ill-appearing. Pulmonary:      Effort: Pulmonary effort is normal. No respiratory distress. Abdominal:      General: There is no distension. Tenderness: There is no abdominal tenderness. There is no right CVA tenderness or left CVA tenderness.    Neurological:      Mental Status: He is alert and oriented to person, place, and time. Mental status is at baseline. Psychiatric:         Mood and Affect: Mood normal.         Behavior: Behavior normal.         DATA:  Results for orders placed or performed in visit on 03/29/21   POCT Urinalysis No Micro (Auto)   Result Value Ref Range    Color, UA yellow     Clarity, UA clear     Glucose, UA POC neg     Bilirubin, UA neg     Ketones, UA trace     Spec Grav, UA 1.030     Blood, UA POC neg     pH, UA 5.5     Protein, UA POC neg     Urobilinogen, UA 0.2     Leukocytes, UA neg     Nitrite, UA neg        ASSESSMENT/PLAN  1. Hypogonadism in male  Currently maintained on 200 mg compounded gel from [address]. We will send in a refill for this today. I will go ahead and put in a referral for heme-onc for further evaluation of polycythemia. He has had higher levels earlier this year and I would like to keep an eye on them, especially with testosterone replacement therapy. - POCT Urinalysis No Micro (Auto)  - CBC; Future  - Testosterone; Future    2. Erectile dysfunction, unspecified erectile dysfunction type  Currently maintained on Cialis. Happy with the results on this medication. We will refill this today. - tadalafil (CIALIS) 20 MG tablet; Take 1 tablet by mouth as needed for Erectile Dysfunction  Dispense: 30 tablet; Refill: 5      Orders Placed This Encounter   Procedures    CBC     Standing Status:   Future     Standing Expiration Date:   3/29/2022    Testosterone     In 6 mos     Standing Status:   Future     Standing Expiration Date:   3/29/2022    POCT Urinalysis No Micro (Auto)        Return in about 6 months (around 9/29/2021) for needs referral to heme/onc for polycythemia. An electronic signature was used to authenticate this note.     VIKY BAXTER, APRN - CNP    All information inputted into the note by the MA to include chief complaint, past medical history, past surgical history, medications, allergies, social and family history and review of systems has been reviewed and updated as needed by me. EMR Dragon/transcription disclaimer: Much of this document is electronic transcription/translation of spoken language to printed text. The electronic translation of spoken language may be erroneous or, at times, nonsensical words or phrases may be inadvertently transcribed.  Although I have reviewed the document for such errors, some may still exist.

## 2021-03-30 DIAGNOSIS — D75.1 POLYCYTHEMIA: Primary | ICD-10-CM

## 2021-04-05 ENCOUNTER — TELEPHONE (OUTPATIENT)
Dept: UROLOGY | Age: 41
End: 2021-04-05

## 2021-04-05 NOTE — TELEPHONE ENCOUNTER
Someone called stating that Rosa Chau left his compound testosterone gel in their house and they are in [place]. Asking if we could call in 1 week to a pharmacy out there?  Here is the # she left [phone]

## 2021-11-22 ENCOUNTER — TELEPHONE (OUTPATIENT)
Dept: UROLOGY | Age: 41
End: 2021-11-22

## 2021-11-22 NOTE — TELEPHONE ENCOUNTER
Attempted to call pt  11/19/21 & 11/22/21 lt  stating that this appt was going to be canceled today at 9:30a due to not having labs done prior to [place].  Pt needing to rs with labs prior please

## 2022-12-12 DIAGNOSIS — D58.2 ELEVATED HEMOGLOBIN (HCC): Primary | ICD-10-CM

## 2023-01-27 DIAGNOSIS — D58.2 ELEVATED HEMOGLOBIN (HCC): Primary | ICD-10-CM

## 2023-01-27 NOTE — PROGRESS NOTES
Initial consultation note      Pt Name: Mady Cockayne. YOB: 1980  MRN: [id number]    Date of evaluation: 1/30/2023  History Obtained From:  patient, electronic medical record    CHIEF COMPLAINT:    Chief Complaint   Patient presents with    New Patient     Erythrocytosis    Discuss Labs     HISTORY OF PRESENT ILLNESS:    Mady Cockayne. is a 38 y.o.  male who is here today in initial hematology consultation for erythrocytosis, referred by RAJ Feliciano for further evaluation and recommendations. Jace Bishop has a medical history to include hypogonadism with chronic gonadotropin therapy, erectile dysfunction, anxiety, depression, hypertension, chronic back pain and smokeless tobacco use. He denied the use of alcohol other than only having occasional beer. He denied ever requiring blood transfusions. He reported donating blood to the [facility] in early December 2022. Jace Bishop denied a known history of thrombotic events to include deep vein thrombosis, pulmonary emboli, cerebral accident or transient ischemic attack. He denied any known family history of blood disorders. Norberto complained of his feet always being cold, denied any known vascular disease. He denied experiencing headaches, vision changes or pruritus. He reported that he is currently using testosterone 200 mg gel 1 pump to his skin, every other day versus daily in attempt to lower his hematocrit. He reports experiencing significant fatigue and decreased energy level. I reviewed the following findings at initial consultation on 1/30/2023:    He has previously been evaluated by Dr. Gilda Krishnamurthy with oncology on 1/16/2020. Jace Bishop did not return after the initial consult visit and when questioned about the appointment he was unable to recall ever having the appointment with Dr. Qi Dickey. In January 2020 it was documented that he was taking chronic gonadotropin 12.5 units IM twice weekly.   Dr. Qi Dickey documented erythrocytosis suspected to be secondary to testosterone replacement and tobacco use. Recommendation was given to cease smoking and discontinue testosterone replacement. Lab work was requested although unable to locate results, most likely were not completed. In review of Dr. Jak Robin  consultation note on 1/16/2020 I obtained the following information:  8/3/2018: Hemoglobin 15.5, hematocrit 45.2, MCV 82.9 and WBC of 7.3 with a platelet count of 820,994: Normal CMP  2/22/2019: Hemoglobin 17.9, hematocrit 53.3, MCV 86.7, WBC 11.64 and a platelet count of 211,450: Normal CMP  10/24/2019: Hemoglobin 18.3, hematocrit 54.5, MCV 87.2, WBC 10.03 and a platelet count of 445,518: Normal CMP      10/31/2022 Serology results  WBC 6.4, Hgb 19.6, Hct 55.5, MCV 87, platelets 968,526  Bili 0.9  AST 24  ALT 34  Alk Phos 57    11/03/2022 Serology results  Vitamin D 32  B12:  585  T3:  3.2  T4:  8.5  TSH 4.980  Testosterone 334  Estradiol 49.2    CBC at initial consultation today 1/30/2023: WBC 4.57, ANC 2.38, hemoglobin 17.2, hematocrit 50.4, RBC 5.77 and a platelet count of 737,498. SPO2 evaluation on 1/30/2023:   Baseline 95% on room air  Ambulating 98% on room air  1 minute recovery 98% on room air    Will evaluate for myeloproliferative disorder and if no evidence of JAK2 mutation is identified will recommend continuing donating blood to the [facility] to maintain a hematocrit <60. Would not recommend phlebotomizing unless hematocrit was >60 or last evening is symptomatic with vision changes, headaches and/or pruritus. Unfortunately the option of discontinuing testosterone replacement would have a negative impact on Norberto's quality of life, without he experiences significant fatigue, decreased libido and poor energy level. He is currently taking aspirin 81 mg a day, no contraindication from a hematology standpoint to continue daily aspirin, will defer to PCP.     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 cyclobenzaprine (FLEXERIL) 10 MG tablet Take 10 mg by mouth 3 times daily as needed for Muscle spasms      Aspirin Buf,CaCarb-MgCarb-MgO, 81 MG TABS Take 81 mg by mouth daily      Testosterone 20 % CREA       tadalafil (CIALIS) 20 MG tablet Take 1 tablet by mouth as needed for Erectile Dysfunction (Patient not taking: Reported on 2023) 30 tablet 5    DULoxetine (CYMBALTA) 30 MG extended release capsule  (Patient not taking: Reported on 2023)      loratadine-pseudoephedrine (CLARITIN-D 24 HOUR)  MG per tablet Take 1 tablet by mouth daily as needed (congestion and sinus pressure). (Patient not taking: Reported on 2023) 30 tablet 2     No current facility-administered medications for this visit. Allergies: Allergies   Allergen Reactions    Shellfish Allergy Other (See Comments)     Pt unsure  Pt unsure      Shrimp (Diagnostic)      Other reaction(s): Provider Review Needed  Pt unsure    Topiramate      Other reaction(s): Unknown (See Comments)  Patient states he has blurry vision  Patient states he has blurry vision      Caplyta [Lumateperone Tosylate] Rash    Ketorolac Tromethamine Palpitations       Social History:    Social History     Tobacco Use    Smoking status: Former     Packs/day: 0.50     Types: Cigarettes    Smokeless tobacco: Current     Types: Snuff   Substance Use Topics    Alcohol use:  No Drug use: Never       Family History:   History reviewed. No pertinent family history. Vitals:  Vitals:    01/30/23 1341   BP: 126/72   Pulse: 76   SpO2: 98%   Weight: 200 lb 9.6 oz (91 kg)   Height: 5' 11.2\" (1.808 m)        Subjective   REVIEW OF SYSTEMS:   Review of Systems   Constitutional:  Positive for chills and fatigue. Negative for diaphoresis and fever. HENT: Negative. Negative for congestion, ear pain, hearing loss, nosebleeds, sore throat and tinnitus. Eyes: Negative. Negative for pain, discharge and redness. Respiratory: Negative. Negative for cough, shortness of breath and wheezing. Cardiovascular: Negative. Negative for chest pain, palpitations and leg swelling. Gastrointestinal: Negative. Negative for abdominal pain, blood in stool, constipation, diarrhea, nausea and vomiting. Endocrine: Negative for polydipsia. Genitourinary:  Negative for dysuria, flank pain, frequency, hematuria and urgency. Musculoskeletal: Negative. Negative for back pain, myalgias and neck pain. Skin: Negative. Negative for rash. Neurological: Negative. Negative for dizziness, tremors, seizures, weakness and headaches. Hematological:  Does not bruise/bleed easily. Psychiatric/Behavioral:  The patient is nervous/anxious. Objective   PHYSICAL EXAM:  Physical Exam  Vitals reviewed. Constitutional:       General: He is not in acute distress. Appearance: He is well-developed. HENT:      Head: Normocephalic and atraumatic. Mouth/Throat:      Pharynx: Uvula midline. Tonsils: No tonsillar exudate. Eyes:      General: Lids are normal.      Conjunctiva/sclera: Conjunctivae normal.      Pupils: Pupils are equal, round, and reactive to light. Neck:      Thyroid: No thyroid mass or thyromegaly. Vascular: No JVD. Trachea: Trachea normal. No tracheal deviation. Cardiovascular:      Rate and Rhythm: Normal rate and regular rhythm. Pulses: Normal pulses.       Heart sounds: Normal heart sounds. Pulmonary:      Effort: Pulmonary effort is normal. No respiratory distress. Breath sounds: Normal breath sounds. No wheezing or rales. Chest:      Chest wall: No tenderness. Abdominal:      General: Bowel sounds are normal. There is no distension. Palpations: Abdomen is soft. There is no mass. Tenderness: There is no abdominal tenderness. There is no guarding. Musculoskeletal:         General: No tenderness or deformity. Cervical back: Normal range of motion and neck supple. Comments: Range of motion within normal limits x4 extremities   Skin:     General: Skin is warm. Findings: No bruising, erythema or rash. Neurological:      Mental Status: He is alert and oriented to person, place, and time. Cranial Nerves: No cranial nerve deficit. Coordination: Coordination normal.   Psychiatric:         Behavior: Behavior normal.         Thought Content: Thought content normal.       Labs reviewed today:  Lab Results   Component Value Date    WBC 4.57 01/30/2023    HGB 17.2 01/30/2023    HCT 50.4 01/30/2023    MCV 87.3 01/30/2023     01/30/2023     Lab Results   Component Value Date    NEUTROABS 2.38 01/30/2023       ASSESSMENT/PLAN:     1. Elevated hemoglobin/suspected to be secondary to testosterone replacement with differential diagnosis of myeloproliferative disorder (MPN)  Denied smoking cigarettes although reported smokeless tobacco use, dipping (1 to 2 cans daily). He denied the use of alcohol other than only having occasional beer. He denied ever requiring blood transfusions. Kimmie Sims denied a known history of thrombotic events to include deep vein thrombosis, pulmonary emboli, cerebral accident or transient ischemic attack. He denied experiencing headaches, vision changes or pruritus. He reports experiencing significant fatigue and decreased energy level.      Review of previous CBCs and CBC today did not reveal any evidence of leukocytosis or thrombocytopenia. CBC today: WBC 4.57, ANC 2.38, hemoglobin 17.2, hematocrit 50.4, RBC 5.77 and a platelet count of 180,153    He reported donating blood to the [place] in early December 2022.         11/03/2022 Serology results  Vitamin D 32  B12:  585  T3:  3.2  T4:  8.5  TSH 4.980  Testosterone 334  Estradiol 49.2    SPO2 evaluation on 1/30/2023:   Baseline 95% on room air  Ambulating 98% on room air  1 minute recovery 98% on room air    Will evaluate for myeloproliferative disorder and if no evidence of JAK2 mutation is identified will recommend to continue donating blood to the [facility] to maintain a hematocrit <60. Would not recommend phlebotomizing unless hematocrit was >60 or last evening is symptomatic with vision changes, headaches and/or pruritus. Unfortunately the option of discontinuing testosterone replacement would have a negative impact on Norberto's quality of life, without he experiences significant fatigue, decreased libido and poor energy level. He is currently taking aspirin 81 mg a day, no contraindication from a hematology standpoint to continue daily aspirin, will defer to PCP. Serology studies to be completed today:  - Comprehensive Metabolic Panel; Future  - JAK2 V617F Mutation Analysis, Qual rflx CALR/Exon 12-15/MPL; Future  - Erythropoietin; Future  -Carboxyhemoglobin    2. Hypogonadism in male, reported that he is currently using testosterone 200 mg gel 1 pump to his skin, every other day versus daily in attempt to lower his hematocrit.      -Okay to continue testosterone 2 mg gel 1 pump to skin daily as prescribed by PCP    3.  Smokeless tobacco use: Reports dipping 1 to 2 cans daily  We talked about the importance of quitting the use of smokeless tobacco dipping for approximately 4-5 minutes. Specifically, we discussed the risk related tobacco including but not limited to carcinoma, cardiovascular disease, stroke, and financial loss.  I advised to quit smoking . The patient is contemplated at this time. I will follow-up on .e cessation during the next visit and continue to encourage quitting tobacco use. I discussed all of the above findings included in the assessment and plan with the patient and the patient is in agreement to move forward with current recommendations/treatment. I have addressed all of their questions and concerns that were verbalized. FOLLOW UP:  Follow-up appointment made for 7 weeks, or sooner, if needed  Continue to follow with other medical providers as recommended  Labs at next visit: [facility]    EMR Dragon/Transcription disclaimer:   Much of this encounter note is an electronic transcription/translation of spoken language to printed text. The electronic translation of spoken language may permit erroneous, or at times, nonsensical words or phrases to be inadvertently transcribed; although attempts have made to review the note for such errors, some may still exist.  Please excuse any unrecognized transcription errors and contact us if the error is unintelligible or needs documented correction. Also, portions of this note have been copied forward, however, changed to reflect the most current clinical status of this patient. Electronically signed by RAJ Briggs on 1/30/2023 at 8:06 PM  Elana AGUILA am pre-charting as a registered nurse for RAJ Richards.

## 2023-01-30 ENCOUNTER — HOSPITAL ENCOUNTER (OUTPATIENT)
Dept: INFUSION THERAPY | Age: 43
Discharge: HOME | End: 2023-01-30
Payer: COMMERCIAL

## 2023-01-30 ENCOUNTER — OFFICE VISIT (OUTPATIENT)
Dept: HEMATOLOGY | Age: 43
End: 2023-01-30
Payer: COMMERCIAL

## 2023-01-30 VITALS
BODY MASS INDEX: 28.08 KG/M2 | WEIGHT: 200.6 LBS | HEIGHT: 71 IN | SYSTOLIC BLOOD PRESSURE: 126 MMHG | OXYGEN SATURATION: 98 % | DIASTOLIC BLOOD PRESSURE: 72 MMHG | HEART RATE: 76 BPM

## 2023-01-30 DIAGNOSIS — E29.1 HYPOGONADISM IN MALE: ICD-10-CM

## 2023-01-30 DIAGNOSIS — Z72.0 SMOKELESS TOBACCO USE: ICD-10-CM

## 2023-01-30 DIAGNOSIS — D58.2 ELEVATED HEMOGLOBIN (HCC): ICD-10-CM

## 2023-01-30 DIAGNOSIS — D58.2 ELEVATED HEMOGLOBIN (HCC): Primary | ICD-10-CM

## 2023-01-30 LAB
ALBUMIN SERPL-MCNC: 4.6 G/DL (ref 3.5–5.2)
ALP BLD-CCNC: 57 U/L (ref 40–130)
ALT SERPL-CCNC: 26 U/L (ref 21–72)
ANION GAP SERPL CALCULATED.3IONS-SCNC: 6 MMOL/L (ref 7–19)
AST SERPL-CCNC: 30 U/L (ref 17–59)
BASOPHILS ABSOLUTE: 0.05 K/UL (ref 0.01–0.08)
BASOPHILS RELATIVE PERCENT: 1.1 % (ref 0.1–1.2)
BILIRUB SERPL-MCNC: 0.7 MG/DL (ref 0.2–1.3)
BUN BLDV-MCNC: 9 MG/DL (ref 9–20)
CALCIUM SERPL-MCNC: 10.2 MG/DL (ref 8.4–10.2)
CHLORIDE BLD-SCNC: 106 MMOL/L (ref 98–111)
CO2: 29 MMOL/L (ref 22–29)
CREAT SERPL-MCNC: 1.1 MG/DL (ref 0.6–1.2)
EOSINOPHILS ABSOLUTE: 0.06 K/UL (ref 0.04–0.54)
EOSINOPHILS RELATIVE PERCENT: 1.3 % (ref 0.7–7)
GFR SERPL CREATININE-BSD FRML MDRD: >60 ML/MIN/{1.73_M2}
GLOBULIN: 3 G/DL
GLUCOSE BLD-MCNC: 114 MG/DL (ref 74–106)
HCT VFR BLD CALC: 50.4 % (ref 40.1–51)
HEMOGLOBIN: 17.2 G/DL (ref 13.7–17.5)
LYMPHOCYTES ABSOLUTE: 1.75 K/UL (ref 1.18–3.74)
LYMPHOCYTES RELATIVE PERCENT: 38.3 % (ref 19.3–53.1)
Lab: NORMAL
MCH RBC QN AUTO: 29.8 PG (ref 25.7–32.2)
MCHC RBC AUTO-ENTMCNC: 34.1 G/DL (ref 32.3–36.5)
MCV RBC AUTO: 87.3 FL (ref 79–92.2)
MONOCYTES ABSOLUTE: 0.32 K/UL (ref 0.24–0.82)
MONOCYTES RELATIVE PERCENT: 7 % (ref 4.7–12.5)
NEUTROPHILS ABSOLUTE: 2.38 K/UL (ref 1.56–6.13)
NEUTROPHILS RELATIVE PERCENT: 52.1 % (ref 34–71.1)
PDW BLD-RTO: 12.8 % (ref 11.6–14.4)
PLATELET # BLD: 178 K/UL (ref 163–337)
PMV BLD AUTO: 11.6 FL (ref 7.4–10.4)
POTASSIUM SERPL-SCNC: 4.9 MMOL/L (ref 3.5–5.1)
RBC # BLD: 5.77 M/UL (ref 4.63–6.08)
REPORT: NORMAL
SODIUM BLD-SCNC: 141 MMOL/L (ref 137–145)
THIS TEST SENT TO: NORMAL
TOTAL PROTEIN: 7.7 G/DL (ref 6.3–8.2)
WBC # BLD: 4.57 K/UL (ref 4.23–9.07)

## 2023-01-30 PROCEDURE — 36415 COLL VENOUS BLD VENIPUNCTURE: CPT

## 2023-01-30 PROCEDURE — 99204 OFFICE O/P NEW MOD 45 MIN: CPT | Performed by: NURSE PRACTITIONER

## 2023-01-30 PROCEDURE — 99212 OFFICE O/P EST SF 10 MIN: CPT

## 2023-01-30 PROCEDURE — 85025 COMPLETE CBC W/AUTO DIFF WBC: CPT

## 2023-01-30 PROCEDURE — 80053 COMPREHEN METABOLIC PANEL: CPT

## 2023-01-30 RX ORDER — LORAZEPAM 1 MG/1
1 TABLET ORAL EVERY 6 HOURS PRN
COMMUNITY

## 2023-01-30 RX ORDER — CYCLOBENZAPRINE HCL 10 MG
10 TABLET ORAL 3 TIMES DAILY PRN
COMMUNITY

## 2023-01-30 ASSESSMENT — ENCOUNTER SYMPTOMS
SHORTNESS OF BREATH: 0
BLOOD IN STOOL: 0
RESPIRATORY NEGATIVE: 1
NAUSEA: 0
EYE DISCHARGE: 0
EYES NEGATIVE: 1
WHEEZING: 0
EYE PAIN: 0
VOMITING: 0
CONSTIPATION: 0
COUGH: 0
BACK PAIN: 0
GASTROINTESTINAL NEGATIVE: 1
DIARRHEA: 0
ABDOMINAL PAIN: 0
SORE THROAT: 0
EYE REDNESS: 0

## 2023-01-30 ASSESSMENT — PROMIS GLOBAL HEALTH SCALE
IN GENERAL, HOW WOULD YOU RATE YOUR SATISFACTION WITH YOUR SOCIAL ACTIVITIES AND RELATIONSHIPS [ON A SCALE OF 1 (POOR) TO 5 (EXCELLENT)]?: 3
IN GENERAL, HOW WOULD YOU RATE YOUR MENTAL HEALTH, INCLUDING YOUR MOOD AND YOUR ABILITY TO THINK [ON A SCALE OF 1 (POOR) TO 5 (EXCELLENT)]?: 2
SUM OF RESPONSES TO QUESTIONS 3, 6, 7, & 8: 16
IN GENERAL, WOULD YOU SAY YOUR HEALTH IS...[ON A SCALE OF 1 (POOR) TO 5 (EXCELLENT)]: 4
IN THE PAST 7 DAYS, HOW WOULD YOU RATE YOUR PAIN ON AVERAGE [ON A SCALE FROM 0 (NO PAIN) TO 10 (WORST IMAGINABLE PAIN)]?: 5
IN GENERAL, HOW WOULD YOU RATE YOUR PHYSICAL HEALTH [ON A SCALE OF 1 (POOR) TO 5 (EXCELLENT)]?: 4
IN GENERAL, PLEASE RATE HOW WELL YOU CARRY OUT YOUR USUAL SOCIAL ACTIVITIES (INCLUDES ACTIVITIES AT HOME, AT WORK, AND IN YOUR COMMUNITY, AND RESPONSIBILITIES AS A PARENT, CHILD, SPOUSE, EMPLOYEE, FRIEND, ETC) [ON A SCALE OF 1 (POOR) TO 5 (EXCELLENT)]?: 2
IN THE PAST 7 DAYS, HOW WOULD YOU RATE YOUR FATIGUE ON AVERAGE [ON A SCALE FROM 1 (NONE) TO 5 (VERY SEVERE)]?: 2
TO WHAT EXTENT ARE YOU ABLE TO CARRY OUT YOUR EVERYDAY PHYSICAL ACTIVITIES SUCH AS WALKING, CLIMBING STAIRS, CARRYING GROCERIES, OR MOVING A CHAIR [ON A SCALE OF 1 (NOT AT ALL) TO 5 (COMPLETELY)]?: 5
IN THE PAST 7 DAYS, HOW OFTEN HAVE YOU BEEN BOTHERED BY EMOTIONAL PROBLEMS, SUCH AS FEELING ANXIOUS, DEPRESSED, OR IRRITABLE [ON A SCALE FROM 1 (NEVER) TO 5 (ALWAYS)]?: 5
IN GENERAL, WOULD YOU SAY YOUR QUALITY OF LIFE IS...[ON A SCALE OF 1 (POOR) TO 5 (EXCELLENT)]: 5
SUM OF RESPONSES TO QUESTIONS 2, 4, 5, & 10: 15

## 2023-02-02 LAB
ERYTHROPOIETIN: 5 MU/ML (ref 4–27)
MISCELLANEOUS LAB TEST ORDER: NORMAL

## 2023-02-09 ENCOUNTER — TELEPHONE (OUTPATIENT)
Dept: HEMATOLOGY | Age: 43
End: 2023-02-09

## 2023-02-09 LAB
BACKGROUND, 480093: NORMAL
BACKGROUND, 489163: NORMAL
CALR MUTATION DETECTION RESULT, 489451: NORMAL
DIRECTOR REVIEW: NORMAL
EXTRACTION: NORMAL
JAK2 EXONS 12-15 MUT DET PCR: NORMAL
JAK2 V617F MUTATION DETECTION 489201: NORMAL
Lab: NORMAL
METHOD: NORMAL
MPL MUTATION ANALYSIS RESULT: NORMAL
REFERENCES: NORMAL
REFERENCES: NORMAL
REFLEX: NORMAL

## 2023-02-09 NOTE — TELEPHONE ENCOUNTER
----- Message from Angelica Parker RN sent at 2/9/2023  2:11 PM CST -----  Regarding: RE: Permission to proceed with Test therapy  Forwarding to you    ----- Message -----  From: Aye Hernández. Sent: 2/9/2023   2:09 PM CST  To: [facility]  Subject: Permission to proceed with Test therapy          I would like to get the testosterone pellet therapy and they would like something from you that says it's ok to continue testosterone therapy. I will have another blood draw to in 5 weeks from when I am treated. I will also donate blood again. Can you email it to [email or web address]

## 2023-02-09 NOTE — TELEPHONE ENCOUNTER
Called and talked to patient wife and informed that Lisa will send a letter to Jon Wolff at [place] [address] that it is ok for him to get the testosterone pellets. Instructed that he does need to donate blood some time within the next week and then we will check his blood count at his appointment on 03/01/2023. Wife v/u and will let Ashley Barron know.

## 2023-05-16 NOTE — TELEPHONE ENCOUNTER
----- Message from Luis Garcia Jr. sent at 12/11/2019 10:40 AM CST -----  Regarding: Non-Urgent Medical Question  Contact: [phone]  Could my hemoglobin and hematocrit be elevated because of my testosterone? I have reduced my intake and still haven't been told the proper amount to be taking since my elevated levels in Oct. I am only giving myself half the amount every other week. This needs to be addressed, as I am no longer being treated or managed by the [facility] after October's incident.     When I visited in Nov to discuss my depression, I aslo brought up my testosterone and ED symptoms. I have seen the fertility specialist you all referred me to, and I have extremely low sperm count likely due to testosterone replacement therapy. I was told by Dr. Hopson the fertility specialist in [place] to see a urologist or my primary care doctor to manage my ED symptoms  
Pt with office visit today.   Has already been referred to urology for testosterone replacement   
3 = A little assistance

## 2025-01-25 ENCOUNTER — HOSPITAL ENCOUNTER (EMERGENCY)
Facility: HOSPITAL | Age: 45
Discharge: LEFT BEFORE DISCHARGE | End: 2025-01-26

## 2025-01-25 VITALS
DIASTOLIC BLOOD PRESSURE: 96 MMHG | TEMPERATURE: 98.5 F | BODY MASS INDEX: 29.12 KG/M2 | RESPIRATION RATE: 20 BRPM | HEART RATE: 95 BPM | SYSTOLIC BLOOD PRESSURE: 162 MMHG | WEIGHT: 215 LBS | HEIGHT: 72 IN | OXYGEN SATURATION: 98 %

## 2025-01-25 PROCEDURE — 93010 ELECTROCARDIOGRAM REPORT: CPT | Performed by: INTERNAL MEDICINE

## 2025-01-25 PROCEDURE — 93005 ELECTROCARDIOGRAM TRACING: CPT | Performed by: EMERGENCY MEDICINE

## 2025-01-25 PROCEDURE — 99211 OFF/OP EST MAY X REQ PHY/QHP: CPT

## 2025-01-26 ENCOUNTER — OFFICE VISIT (OUTPATIENT)
Age: 45
End: 2025-01-26

## 2025-01-26 VITALS
RESPIRATION RATE: 20 BRPM | DIASTOLIC BLOOD PRESSURE: 92 MMHG | OXYGEN SATURATION: 97 % | SYSTOLIC BLOOD PRESSURE: 120 MMHG | HEIGHT: 72 IN | WEIGHT: 218 LBS | HEART RATE: 59 BPM | TEMPERATURE: 98.1 F | BODY MASS INDEX: 29.53 KG/M2

## 2025-01-26 DIAGNOSIS — R03.0 ELEVATED BLOOD PRESSURE READING: Primary | ICD-10-CM

## 2025-01-27 LAB
QT INTERVAL: 388 MS
QTC INTERVAL: 430 MS